# Patient Record
Sex: FEMALE | Race: WHITE | NOT HISPANIC OR LATINO | Employment: OTHER | URBAN - METROPOLITAN AREA
[De-identification: names, ages, dates, MRNs, and addresses within clinical notes are randomized per-mention and may not be internally consistent; named-entity substitution may affect disease eponyms.]

---

## 2018-01-30 ENCOUNTER — APPOINTMENT (OUTPATIENT)
Dept: GENERAL RADIOLOGY | Facility: HOSPITAL | Age: 53
End: 2018-01-30

## 2018-01-30 ENCOUNTER — HOSPITAL ENCOUNTER (INPATIENT)
Facility: HOSPITAL | Age: 53
LOS: 6 days | Discharge: HOME | End: 2018-02-05
Attending: HOSPITALIST | Admitting: FAMILY MEDICINE

## 2018-01-30 ENCOUNTER — APPOINTMENT (OUTPATIENT)
Dept: CARDIOLOGY | Facility: HOSPITAL | Age: 53
End: 2018-01-30
Attending: FAMILY MEDICINE

## 2018-01-30 DIAGNOSIS — Z74.09 IMPAIRED FUNCTIONAL MOBILITY, BALANCE, GAIT, AND ENDURANCE: Primary | ICD-10-CM

## 2018-01-30 PROBLEM — I38 VHD (VALVULAR HEART DISEASE): Status: ACTIVE | Noted: 2018-01-30

## 2018-01-30 PROBLEM — Z95.2 S/P AVR (AORTIC VALVE REPLACEMENT): Status: ACTIVE | Noted: 2018-01-30

## 2018-01-30 PROBLEM — E87.70 VOLUME OVERLOAD: Status: ACTIVE | Noted: 2018-01-30

## 2018-01-30 PROBLEM — R79.1 SUBTHERAPEUTIC INTERNATIONAL NORMALIZED RATIO (INR): Status: ACTIVE | Noted: 2018-01-30

## 2018-01-30 PROBLEM — I48.91 A-FIB (HCC): Status: ACTIVE | Noted: 2018-01-30

## 2018-01-30 PROBLEM — Z95.2 S/P MVR (MITRAL VALVE REPLACEMENT): Status: ACTIVE | Noted: 2018-01-30

## 2018-01-30 PROBLEM — Z86.73 H/O: CVA (CEREBROVASCULAR ACCIDENT): Status: ACTIVE | Noted: 2018-01-30

## 2018-01-30 PROBLEM — R91.8 PULMONARY INFILTRATE: Status: ACTIVE | Noted: 2018-01-30

## 2018-01-30 PROBLEM — Z87.891 FORMER SMOKER: Status: ACTIVE | Noted: 2018-01-30

## 2018-01-30 PROBLEM — I09.1 RHEUMATIC VALVULAR DISEASE: Status: ACTIVE | Noted: 2018-01-30

## 2018-01-30 PROBLEM — Z79.01 ANTICOAGULATED ON COUMADIN: Status: ACTIVE | Noted: 2018-01-30

## 2018-01-30 PROBLEM — J18.9 PNEUMONIA: Status: ACTIVE | Noted: 2018-01-30

## 2018-01-30 PROBLEM — I50.33 ACUTE ON CHRONIC DIASTOLIC CONGESTIVE HEART FAILURE (HCC): Status: ACTIVE | Noted: 2018-01-30

## 2018-01-30 LAB
ALBUMIN SERPL-MCNC: 4.8 G/DL (ref 3.2–4.8)
ALBUMIN/GLOB SERPL: 1.3 G/DL (ref 1.5–2.5)
ALP SERPL-CCNC: 75 U/L (ref 25–100)
ALT SERPL W P-5'-P-CCNC: 20 U/L (ref 7–40)
ANION GAP SERPL CALCULATED.3IONS-SCNC: 11 MMOL/L (ref 3–11)
ARTERIAL PATENCY WRIST A: ABNORMAL
AST SERPL-CCNC: 32 U/L (ref 0–33)
ATMOSPHERIC PRESS: ABNORMAL MMHG
BACTERIA UR QL AUTO: ABNORMAL /HPF
BASE EXCESS BLDA CALC-SCNC: -1.1 MMOL/L (ref 0–2)
BASOPHILS # BLD AUTO: 0.01 10*3/MM3 (ref 0–0.2)
BASOPHILS # BLD AUTO: 0.01 10*3/MM3 (ref 0–0.2)
BASOPHILS NFR BLD AUTO: 0 % (ref 0–1)
BASOPHILS NFR BLD AUTO: 0.1 % (ref 0–1)
BDY SITE: ABNORMAL
BILIRUB SERPL-MCNC: 1 MG/DL (ref 0.3–1.2)
BILIRUB UR QL STRIP: NEGATIVE
BNP SERPL-MCNC: 707 PG/ML (ref 0–100)
BUN BLD-MCNC: 17 MG/DL (ref 9–23)
BUN/CREAT SERPL: 21.3 (ref 7–25)
CA-I SERPL ISE-MCNC: 1.49 MMOL/L (ref 1.12–1.32)
CALCIUM SPEC-SCNC: 10.7 MG/DL (ref 8.7–10.4)
CHLORIDE SERPL-SCNC: 102 MMOL/L (ref 99–109)
CLARITY UR: ABNORMAL
CO2 BLDA-SCNC: 22.1 MMOL/L (ref 22–33)
CO2 SERPL-SCNC: 21 MMOL/L (ref 20–31)
COHGB MFR BLD: 1.3 % (ref 0–2)
COLOR UR: YELLOW
CREAT BLD-MCNC: 0.8 MG/DL (ref 0.6–1.3)
D-LACTATE SERPL-SCNC: 2.9 MMOL/L (ref 0.5–2)
DEPRECATED RDW RBC AUTO: 44.1 FL (ref 37–54)
DEPRECATED RDW RBC AUTO: 45.7 FL (ref 37–54)
EOSINOPHIL # BLD AUTO: 0 10*3/MM3 (ref 0–0.3)
EOSINOPHIL # BLD AUTO: 0 10*3/MM3 (ref 0–0.3)
EOSINOPHIL NFR BLD AUTO: 0 % (ref 0–3)
EOSINOPHIL NFR BLD AUTO: 0 % (ref 0–3)
ERYTHROCYTE [DISTWIDTH] IN BLOOD BY AUTOMATED COUNT: 14.4 % (ref 11.3–14.5)
ERYTHROCYTE [DISTWIDTH] IN BLOOD BY AUTOMATED COUNT: 14.7 % (ref 11.3–14.5)
GFR SERPL CREATININE-BSD FRML MDRD: 76 ML/MIN/1.73
GLOBULIN UR ELPH-MCNC: 3.8 GM/DL
GLUCOSE BLD-MCNC: 151 MG/DL (ref 70–100)
GLUCOSE UR STRIP-MCNC: NEGATIVE MG/DL
HBA1C MFR BLD: 5.5 % (ref 4.8–5.6)
HCO3 BLDA-SCNC: 21.2 MMOL/L (ref 20–26)
HCT VFR BLD AUTO: 42.1 % (ref 34.5–44)
HCT VFR BLD AUTO: 44.4 % (ref 34.5–44)
HCT VFR BLD CALC: 43.6 %
HGB BLD-MCNC: 13.7 G/DL (ref 11.5–15.5)
HGB BLD-MCNC: 15 G/DL (ref 11.5–15.5)
HGB BLDA-MCNC: 14.2 G/DL (ref 14–18)
HGB UR QL STRIP.AUTO: ABNORMAL
HOLD SPECIMEN: NORMAL
HOROWITZ INDEX BLD+IHG-RTO: 21 %
IMM GRANULOCYTES # BLD: 0.01 10*3/MM3 (ref 0–0.03)
IMM GRANULOCYTES # BLD: 0.11 10*3/MM3 (ref 0–0.03)
IMM GRANULOCYTES NFR BLD: 0.1 % (ref 0–0.6)
IMM GRANULOCYTES NFR BLD: 0.4 % (ref 0–0.6)
INR PPP: 1.28
KETONES UR QL STRIP: NEGATIVE
LEUKOCYTE ESTERASE UR QL STRIP.AUTO: NEGATIVE
LYMPHOCYTES # BLD AUTO: 0.74 10*3/MM3 (ref 0.6–4.8)
LYMPHOCYTES # BLD AUTO: 1 10*3/MM3 (ref 0.6–4.8)
LYMPHOCYTES NFR BLD AUTO: 3.8 % (ref 24–44)
LYMPHOCYTES NFR BLD AUTO: 7.8 % (ref 24–44)
MAGNESIUM SERPL-MCNC: 2.1 MG/DL (ref 1.3–2.7)
MAGNESIUM SERPL-MCNC: 2.1 MG/DL (ref 1.3–2.7)
MCH RBC QN AUTO: 27.8 PG (ref 27–31)
MCH RBC QN AUTO: 28.2 PG (ref 27–31)
MCHC RBC AUTO-ENTMCNC: 32.5 G/DL (ref 32–36)
MCHC RBC AUTO-ENTMCNC: 33.8 G/DL (ref 32–36)
MCV RBC AUTO: 83.5 FL (ref 80–99)
MCV RBC AUTO: 85.6 FL (ref 80–99)
METHGB BLD QL: 0 % (ref 0–1.5)
MODALITY: ABNORMAL
MONOCYTES # BLD AUTO: 0.1 10*3/MM3 (ref 0–1)
MONOCYTES # BLD AUTO: 0.95 10*3/MM3 (ref 0–1)
MONOCYTES NFR BLD AUTO: 1.1 % (ref 0–12)
MONOCYTES NFR BLD AUTO: 3.6 % (ref 0–12)
NEUTROPHILS # BLD AUTO: 24.47 10*3/MM3 (ref 1.5–8.3)
NEUTROPHILS # BLD AUTO: 8.64 10*3/MM3 (ref 1.5–8.3)
NEUTROPHILS NFR BLD AUTO: 90.9 % (ref 41–71)
NEUTROPHILS NFR BLD AUTO: 92.2 % (ref 41–71)
NITRITE UR QL STRIP: NEGATIVE
OXYHGB MFR BLDV: 92.7 % (ref 94–99)
PCO2 BLDA: 28.7 MM HG (ref 35–45)
PH BLDA: 7.48 PH UNITS (ref 7.35–7.45)
PH UR STRIP.AUTO: <=5 [PH] (ref 5–8)
PLATELET # BLD AUTO: 301 10*3/MM3 (ref 150–450)
PLATELET # BLD AUTO: 329 10*3/MM3 (ref 150–450)
PMV BLD AUTO: 11.5 FL (ref 6–12)
PMV BLD AUTO: 11.5 FL (ref 6–12)
PO2 BLDA: 63.4 MM HG (ref 83–108)
POTASSIUM BLD-SCNC: 3.8 MMOL/L (ref 3.5–5.5)
PROCALCITONIN SERPL-MCNC: <0.05 NG/ML
PROT SERPL-MCNC: 8.6 G/DL (ref 5.7–8.2)
PROT UR QL STRIP: NEGATIVE
PROTHROMBIN TIME: 14.1 SECONDS (ref 9.6–11.5)
RBC # BLD AUTO: 4.92 10*6/MM3 (ref 3.89–5.14)
RBC # BLD AUTO: 5.32 10*6/MM3 (ref 3.89–5.14)
RBC # UR: ABNORMAL /HPF
REF LAB TEST METHOD: ABNORMAL
SODIUM BLD-SCNC: 134 MMOL/L (ref 132–146)
SP GR UR STRIP: 1.02 (ref 1–1.03)
SQUAMOUS #/AREA URNS HPF: ABNORMAL /HPF
TROPONIN I SERPL-MCNC: 0.04 NG/ML
TROPONIN I SERPL-MCNC: 0.05 NG/ML
TSH SERPL DL<=0.05 MIU/L-ACNC: 1.25 MIU/ML (ref 0.35–5.35)
UROBILINOGEN UR QL STRIP: ABNORMAL
WBC NRBC COR # BLD: 26.54 10*3/MM3 (ref 3.5–10.8)
WBC NRBC COR # BLD: 9.5 10*3/MM3 (ref 3.5–10.8)
WBC UR QL AUTO: ABNORMAL /HPF

## 2018-01-30 PROCEDURE — 99223 1ST HOSP IP/OBS HIGH 75: CPT | Performed by: FAMILY MEDICINE

## 2018-01-30 PROCEDURE — 94640 AIRWAY INHALATION TREATMENT: CPT

## 2018-01-30 PROCEDURE — 82330 ASSAY OF CALCIUM: CPT | Performed by: FAMILY MEDICINE

## 2018-01-30 PROCEDURE — 85025 COMPLETE CBC W/AUTO DIFF WBC: CPT | Performed by: FAMILY MEDICINE

## 2018-01-30 PROCEDURE — 83036 HEMOGLOBIN GLYCOSYLATED A1C: CPT | Performed by: FAMILY MEDICINE

## 2018-01-30 PROCEDURE — 63710000001 PREDNISONE PER 1 MG: Performed by: FAMILY MEDICINE

## 2018-01-30 PROCEDURE — 83880 ASSAY OF NATRIURETIC PEPTIDE: CPT | Performed by: FAMILY MEDICINE

## 2018-01-30 PROCEDURE — 25010000002 ENOXAPARIN PER 10 MG

## 2018-01-30 PROCEDURE — 87086 URINE CULTURE/COLONY COUNT: CPT | Performed by: FAMILY MEDICINE

## 2018-01-30 PROCEDURE — 84484 ASSAY OF TROPONIN QUANT: CPT | Performed by: FAMILY MEDICINE

## 2018-01-30 PROCEDURE — 84443 ASSAY THYROID STIM HORMONE: CPT | Performed by: FAMILY MEDICINE

## 2018-01-30 PROCEDURE — 85610 PROTHROMBIN TIME: CPT | Performed by: FAMILY MEDICINE

## 2018-01-30 PROCEDURE — 83605 ASSAY OF LACTIC ACID: CPT | Performed by: NURSE PRACTITIONER

## 2018-01-30 PROCEDURE — 25010000002 DIGOXIN PER 500 MCG: Performed by: NURSE PRACTITIONER

## 2018-01-30 PROCEDURE — 93005 ELECTROCARDIOGRAM TRACING: CPT | Performed by: NURSE PRACTITIONER

## 2018-01-30 PROCEDURE — 83735 ASSAY OF MAGNESIUM: CPT | Performed by: FAMILY MEDICINE

## 2018-01-30 PROCEDURE — 94799 UNLISTED PULMONARY SVC/PX: CPT

## 2018-01-30 PROCEDURE — 94760 N-INVAS EAR/PLS OXIMETRY 1: CPT

## 2018-01-30 PROCEDURE — 81001 URINALYSIS AUTO W/SCOPE: CPT | Performed by: FAMILY MEDICINE

## 2018-01-30 PROCEDURE — 25010000002 AMIODARONE IN DEXTROSE 5% 150-4.21 MG/100ML-% SOLUTION: Performed by: INTERNAL MEDICINE

## 2018-01-30 PROCEDURE — 93306 TTE W/DOPPLER COMPLETE: CPT

## 2018-01-30 PROCEDURE — 82805 BLOOD GASES W/O2 SATURATION: CPT | Performed by: NURSE PRACTITIONER

## 2018-01-30 PROCEDURE — 84484 ASSAY OF TROPONIN QUANT: CPT | Performed by: NURSE PRACTITIONER

## 2018-01-30 PROCEDURE — 25010000002 AMIODARONE IN DEXTROSE 5% 360-4.14 MG/200ML-% SOLUTION: Performed by: INTERNAL MEDICINE

## 2018-01-30 PROCEDURE — 87040 BLOOD CULTURE FOR BACTERIA: CPT | Performed by: FAMILY MEDICINE

## 2018-01-30 PROCEDURE — 36600 WITHDRAWAL OF ARTERIAL BLOOD: CPT | Performed by: NURSE PRACTITIONER

## 2018-01-30 PROCEDURE — 71045 X-RAY EXAM CHEST 1 VIEW: CPT

## 2018-01-30 PROCEDURE — 93005 ELECTROCARDIOGRAM TRACING: CPT | Performed by: FAMILY MEDICINE

## 2018-01-30 PROCEDURE — 25010000002 CEFTRIAXONE PER 250 MG: Performed by: FAMILY MEDICINE

## 2018-01-30 PROCEDURE — 84145 PROCALCITONIN (PCT): CPT | Performed by: FAMILY MEDICINE

## 2018-01-30 PROCEDURE — 25010000002 FUROSEMIDE PER 20 MG: Performed by: FAMILY MEDICINE

## 2018-01-30 PROCEDURE — 93010 ELECTROCARDIOGRAM REPORT: CPT | Performed by: INTERNAL MEDICINE

## 2018-01-30 PROCEDURE — 85025 COMPLETE CBC W/AUTO DIFF WBC: CPT | Performed by: NURSE PRACTITIONER

## 2018-01-30 PROCEDURE — 83735 ASSAY OF MAGNESIUM: CPT | Performed by: NURSE PRACTITIONER

## 2018-01-30 PROCEDURE — 99291 CRITICAL CARE FIRST HOUR: CPT | Performed by: INTERNAL MEDICINE

## 2018-01-30 PROCEDURE — 80053 COMPREHEN METABOLIC PANEL: CPT | Performed by: FAMILY MEDICINE

## 2018-01-30 RX ORDER — DIGOXIN 0.25 MG/ML
500 INJECTION INTRAMUSCULAR; INTRAVENOUS ONCE
Status: COMPLETED | OUTPATIENT
Start: 2018-01-30 | End: 2018-01-30

## 2018-01-30 RX ORDER — ACETAMINOPHEN 325 MG/1
650 TABLET ORAL EVERY 4 HOURS PRN
Status: DISCONTINUED | OUTPATIENT
Start: 2018-01-30 | End: 2018-02-05 | Stop reason: HOSPADM

## 2018-01-30 RX ORDER — CARVEDILOL 3.12 MG/1
3.12 TABLET ORAL 2 TIMES DAILY
Status: DISCONTINUED | OUTPATIENT
Start: 2018-01-30 | End: 2018-01-30

## 2018-01-30 RX ORDER — FUROSEMIDE 40 MG/1
40 TABLET ORAL DAILY
Status: DISCONTINUED | OUTPATIENT
Start: 2018-01-30 | End: 2018-01-31

## 2018-01-30 RX ORDER — SODIUM CHLORIDE 0.9 % (FLUSH) 0.9 %
1-10 SYRINGE (ML) INJECTION AS NEEDED
Status: DISCONTINUED | OUTPATIENT
Start: 2018-01-30 | End: 2018-02-05 | Stop reason: HOSPADM

## 2018-01-30 RX ORDER — FUROSEMIDE 10 MG/ML
40 INJECTION INTRAMUSCULAR; INTRAVENOUS EVERY 12 HOURS
Status: DISCONTINUED | OUTPATIENT
Start: 2018-01-30 | End: 2018-01-30

## 2018-01-30 RX ORDER — WARFARIN SODIUM 5 MG/1
5 TABLET ORAL
Status: DISCONTINUED | OUTPATIENT
Start: 2018-01-30 | End: 2018-01-30

## 2018-01-30 RX ORDER — NYSTATIN 100000 [USP'U]/G
POWDER TOPICAL EVERY 12 HOURS SCHEDULED
Status: DISCONTINUED | OUTPATIENT
Start: 2018-01-30 | End: 2018-02-05 | Stop reason: HOSPADM

## 2018-01-30 RX ORDER — ALBUMIN, HUMAN INJ 5% 5 %
500 SOLUTION INTRAVENOUS ONCE
Status: COMPLETED | OUTPATIENT
Start: 2018-01-31 | End: 2018-01-30

## 2018-01-30 RX ORDER — IPRATROPIUM BROMIDE AND ALBUTEROL SULFATE 2.5; .5 MG/3ML; MG/3ML
3 SOLUTION RESPIRATORY (INHALATION)
Status: DISCONTINUED | OUTPATIENT
Start: 2018-01-30 | End: 2018-02-03

## 2018-01-30 RX ORDER — ONDANSETRON 2 MG/ML
4 INJECTION INTRAMUSCULAR; INTRAVENOUS EVERY 6 HOURS PRN
Status: DISCONTINUED | OUTPATIENT
Start: 2018-01-30 | End: 2018-02-05 | Stop reason: HOSPADM

## 2018-01-30 RX ORDER — WARFARIN SODIUM 3 MG/1
6 TABLET ORAL
Status: DISCONTINUED | OUTPATIENT
Start: 2018-01-30 | End: 2018-02-01

## 2018-01-30 RX ORDER — AMIODARONE HYDROCHLORIDE 200 MG/1
200 TABLET ORAL DAILY
Status: DISCONTINUED | OUTPATIENT
Start: 2018-02-22 | End: 2018-02-05 | Stop reason: HOSPADM

## 2018-01-30 RX ORDER — BISACODYL 5 MG/1
5 TABLET, DELAYED RELEASE ORAL DAILY PRN
Status: DISCONTINUED | OUTPATIENT
Start: 2018-01-30 | End: 2018-02-05 | Stop reason: HOSPADM

## 2018-01-30 RX ORDER — PREDNISONE 20 MG/1
40 TABLET ORAL
Status: DISCONTINUED | OUTPATIENT
Start: 2018-01-30 | End: 2018-01-31

## 2018-01-30 RX ORDER — POTASSIUM CHLORIDE 1.5 G/1.77G
20 POWDER, FOR SOLUTION ORAL 2 TIMES DAILY
Status: DISCONTINUED | OUTPATIENT
Start: 2018-01-30 | End: 2018-01-30

## 2018-01-30 RX ORDER — MAGNESIUM SULFATE HEPTAHYDRATE 40 MG/ML
2 INJECTION, SOLUTION INTRAVENOUS AS NEEDED
Status: DISCONTINUED | OUTPATIENT
Start: 2018-01-30 | End: 2018-02-05 | Stop reason: HOSPADM

## 2018-01-30 RX ORDER — POTASSIUM CHLORIDE 750 MG/1
20 CAPSULE, EXTENDED RELEASE ORAL 2 TIMES DAILY
Status: DISCONTINUED | OUTPATIENT
Start: 2018-01-30 | End: 2018-02-05 | Stop reason: HOSPADM

## 2018-01-30 RX ORDER — AMIODARONE HYDROCHLORIDE 200 MG/1
200 TABLET ORAL EVERY 12 HOURS
Status: DISCONTINUED | OUTPATIENT
Start: 2018-02-07 | End: 2018-02-05 | Stop reason: HOSPADM

## 2018-01-30 RX ORDER — AMIODARONE HYDROCHLORIDE 200 MG/1
200 TABLET ORAL ONCE
Status: COMPLETED | OUTPATIENT
Start: 2018-01-31 | End: 2018-01-31

## 2018-01-30 RX ORDER — POTASSIUM CHLORIDE 7.45 MG/ML
10 INJECTION INTRAVENOUS
Status: DISCONTINUED | OUTPATIENT
Start: 2018-01-30 | End: 2018-02-05 | Stop reason: HOSPADM

## 2018-01-30 RX ORDER — FUROSEMIDE 40 MG/1
40 TABLET ORAL DAILY
COMMUNITY
End: 2018-02-05 | Stop reason: HOSPADM

## 2018-01-30 RX ORDER — ATORVASTATIN CALCIUM 10 MG/1
10 TABLET, FILM COATED ORAL DAILY
Status: DISCONTINUED | OUTPATIENT
Start: 2018-01-30 | End: 2018-02-02

## 2018-01-30 RX ORDER — SENNA AND DOCUSATE SODIUM 50; 8.6 MG/1; MG/1
2 TABLET, FILM COATED ORAL NIGHTLY
Status: DISCONTINUED | OUTPATIENT
Start: 2018-01-30 | End: 2018-02-05 | Stop reason: HOSPADM

## 2018-01-30 RX ORDER — MAGNESIUM SULFATE HEPTAHYDRATE 40 MG/ML
4 INJECTION, SOLUTION INTRAVENOUS AS NEEDED
Status: DISCONTINUED | OUTPATIENT
Start: 2018-01-30 | End: 2018-02-05 | Stop reason: HOSPADM

## 2018-01-30 RX ORDER — POTASSIUM CHLORIDE 20 MEQ/1
20 TABLET, EXTENDED RELEASE ORAL 2 TIMES DAILY
COMMUNITY
End: 2018-02-05 | Stop reason: HOSPADM

## 2018-01-30 RX ORDER — CARVEDILOL 3.12 MG/1
3.12 TABLET ORAL 2 TIMES DAILY
COMMUNITY
End: 2018-02-05 | Stop reason: HOSPADM

## 2018-01-30 RX ORDER — WARFARIN SODIUM 6 MG/1
6 TABLET ORAL
Status: ON HOLD | COMMUNITY
End: 2018-01-31

## 2018-01-30 RX ORDER — POTASSIUM CHLORIDE 750 MG/1
40 CAPSULE, EXTENDED RELEASE ORAL AS NEEDED
Status: DISCONTINUED | OUTPATIENT
Start: 2018-01-30 | End: 2018-02-05 | Stop reason: HOSPADM

## 2018-01-30 RX ORDER — CEFTRIAXONE SODIUM 1 G/50ML
1 INJECTION, SOLUTION INTRAVENOUS EVERY 24 HOURS
Status: DISCONTINUED | OUTPATIENT
Start: 2018-01-30 | End: 2018-02-01

## 2018-01-30 RX ORDER — DILTIAZEM HCL IN NACL,ISO-OSM 125 MG/125
5-15 PLASTIC BAG, INJECTION (ML) INTRAVENOUS
Status: DISCONTINUED | OUTPATIENT
Start: 2018-01-30 | End: 2018-02-01

## 2018-01-30 RX ORDER — ATORVASTATIN CALCIUM 10 MG/1
10 TABLET, FILM COATED ORAL DAILY
COMMUNITY

## 2018-01-30 RX ORDER — POTASSIUM CHLORIDE 1.5 G/1.77G
40 POWDER, FOR SOLUTION ORAL AS NEEDED
Status: DISCONTINUED | OUTPATIENT
Start: 2018-01-30 | End: 2018-02-05 | Stop reason: HOSPADM

## 2018-01-30 RX ORDER — RANITIDINE 300 MG/1
300 TABLET ORAL 2 TIMES DAILY
COMMUNITY
End: 2019-05-02

## 2018-01-30 RX ORDER — AMIODARONE HYDROCHLORIDE 200 MG/1
200 TABLET ORAL EVERY 8 HOURS
Status: DISCONTINUED | OUTPATIENT
Start: 2018-02-01 | End: 2018-02-05 | Stop reason: HOSPADM

## 2018-01-30 RX ORDER — DOXYCYCLINE HYCLATE 100 MG/1
100 CAPSULE ORAL EVERY 12 HOURS SCHEDULED
Status: DISCONTINUED | OUTPATIENT
Start: 2018-01-30 | End: 2018-02-01

## 2018-01-30 RX ORDER — FAMOTIDINE 20 MG/1
40 TABLET, FILM COATED ORAL DAILY
Status: DISCONTINUED | OUTPATIENT
Start: 2018-01-30 | End: 2018-01-31

## 2018-01-30 RX ADMIN — POTASSIUM CHLORIDE 20 MEQ: 750 CAPSULE, EXTENDED RELEASE ORAL at 15:03

## 2018-01-30 RX ADMIN — ENOXAPARIN SODIUM 90 MG: 100 INJECTION SUBCUTANEOUS at 10:03

## 2018-01-30 RX ADMIN — DOXYCYCLINE HYCLATE 100 MG: 100 CAPSULE ORAL at 10:04

## 2018-01-30 RX ADMIN — IPRATROPIUM BROMIDE AND ALBUTEROL SULFATE 3 ML: .5; 3 SOLUTION RESPIRATORY (INHALATION) at 09:26

## 2018-01-30 RX ADMIN — CEFTRIAXONE SODIUM 1 G: 1 INJECTION, SOLUTION INTRAVENOUS at 10:02

## 2018-01-30 RX ADMIN — ALBUMIN HUMAN 500 ML: 0.05 INJECTION, SOLUTION INTRAVENOUS at 23:36

## 2018-01-30 RX ADMIN — SACUBITRIL AND VALSARTAN 1 TABLET: 49; 51 TABLET, FILM COATED ORAL at 15:03

## 2018-01-30 RX ADMIN — SODIUM CHLORIDE 250 ML: 9 INJECTION, SOLUTION INTRAVENOUS at 18:21

## 2018-01-30 RX ADMIN — ATORVASTATIN CALCIUM 10 MG: 10 TABLET, FILM COATED ORAL at 13:33

## 2018-01-30 RX ADMIN — SODIUM CHLORIDE 250 ML: 9 INJECTION, SOLUTION INTRAVENOUS at 17:00

## 2018-01-30 RX ADMIN — PREDNISONE 40 MG: 20 TABLET ORAL at 15:03

## 2018-01-30 RX ADMIN — SODIUM CHLORIDE 500 ML: 9 INJECTION, SOLUTION INTRAVENOUS at 11:10

## 2018-01-30 RX ADMIN — DOXYCYCLINE HYCLATE 100 MG: 100 CAPSULE ORAL at 21:34

## 2018-01-30 RX ADMIN — FUROSEMIDE 40 MG: 40 TABLET ORAL at 13:33

## 2018-01-30 RX ADMIN — IPRATROPIUM BROMIDE AND ALBUTEROL SULFATE 3 ML: .5; 3 SOLUTION RESPIRATORY (INHALATION) at 19:08

## 2018-01-30 RX ADMIN — FUROSEMIDE 40 MG: 10 INJECTION, SOLUTION INTRAMUSCULAR; INTRAVENOUS at 10:02

## 2018-01-30 RX ADMIN — DILTIAZEM HYDROCHLORIDE 5 MG/HR: 5 INJECTION INTRAVENOUS at 10:04

## 2018-01-30 RX ADMIN — AMIODARONE HYDROCHLORIDE 1 MG/MIN: 50 INJECTION, SOLUTION INTRAVENOUS at 22:40

## 2018-01-30 RX ADMIN — POTASSIUM CHLORIDE 20 MEQ: 750 CAPSULE, EXTENDED RELEASE ORAL at 21:33

## 2018-01-30 RX ADMIN — AMIODARONE HYDROCHLORIDE 150 MG: 1.5 INJECTION, SOLUTION INTRAVENOUS at 22:28

## 2018-01-30 RX ADMIN — IPRATROPIUM BROMIDE AND ALBUTEROL SULFATE 3 ML: .5; 3 SOLUTION RESPIRATORY (INHALATION) at 15:39

## 2018-01-30 RX ADMIN — ENOXAPARIN SODIUM 90 MG: 100 INJECTION SUBCUTANEOUS at 21:33

## 2018-01-30 RX ADMIN — Medication 2 TABLET: at 21:34

## 2018-01-30 RX ADMIN — FAMOTIDINE 40 MG: 20 TABLET, FILM COATED ORAL at 13:37

## 2018-01-30 RX ADMIN — DIGOXIN 500 MCG: 0.25 INJECTION INTRAMUSCULAR; INTRAVENOUS at 20:46

## 2018-01-30 RX ADMIN — WARFARIN SODIUM 6 MG: 3 TABLET ORAL at 17:50

## 2018-01-31 LAB
ANION GAP SERPL CALCULATED.3IONS-SCNC: 7 MMOL/L (ref 3–11)
BASOPHILS # BLD AUTO: 0.01 10*3/MM3 (ref 0–0.2)
BASOPHILS NFR BLD AUTO: 0 % (ref 0–1)
BH CV ECHO MEAS - AO MAX PG (FULL): 84.9 MMHG
BH CV ECHO MEAS - AO MAX PG: 88 MMHG
BH CV ECHO MEAS - AO MEAN PG (FULL): 50.2 MMHG
BH CV ECHO MEAS - AO MEAN PG: 52.1 MMHG
BH CV ECHO MEAS - AO ROOT AREA: 7.8 CM^2
BH CV ECHO MEAS - AO ROOT DIAM: 3.1 CM
BH CV ECHO MEAS - AO V2 MAX: 467.9 CM/SEC
BH CV ECHO MEAS - AO V2 MEAN: 332.1 CM/SEC
BH CV ECHO MEAS - AO V2 VTI: 89.1 CM
BH CV ECHO MEAS - AVA(I,A): 0.56 CM^2
BH CV ECHO MEAS - AVA(I,D): 0.56 CM^2
BH CV ECHO MEAS - AVA(V,A): 0.52 CM^2
BH CV ECHO MEAS - AVA(V,D): 0.52 CM^2
BH CV ECHO MEAS - CO(CUBED): 4.4 L/MIN
BH CV ECHO MEAS - CO(TEICH): 3.8 L/MIN
BH CV ECHO MEAS - EDV(CUBED): 98.1 ML
BH CV ECHO MEAS - EDV(TEICH): 97.9 ML
BH CV ECHO MEAS - EF(CUBED): 61.8 %
BH CV ECHO MEAS - EF(TEICH): 53.4 %
BH CV ECHO MEAS - ESV(CUBED): 37.5 ML
BH CV ECHO MEAS - ESV(TEICH): 45.7 ML
BH CV ECHO MEAS - FS: 27.4 %
BH CV ECHO MEAS - IVS/LVPW: 0.9
BH CV ECHO MEAS - IVSD: 1 CM
BH CV ECHO MEAS - LA DIMENSION: 3.5 CM
BH CV ECHO MEAS - LA/AO: 1.1
BH CV ECHO MEAS - LAT PEAK E' VEL: 10 CM/SEC
BH CV ECHO MEAS - LV MASS(C)D: 171.1 GRAMS
BH CV ECHO MEAS - LV MAX PG: 3.1 MMHG
BH CV ECHO MEAS - LV MEAN PG: 1.9 MMHG
BH CV ECHO MEAS - LV V1 MAX: 87.5 CM/SEC
BH CV ECHO MEAS - LV V1 MEAN: 63.3 CM/SEC
BH CV ECHO MEAS - LV V1 VTI: 17.7 CM
BH CV ECHO MEAS - LVIDD: 4.6 CM
BH CV ECHO MEAS - LVIDS: 3.3 CM
BH CV ECHO MEAS - LVOT AREA (M): 2.8 CM^2
BH CV ECHO MEAS - LVOT AREA: 2.8 CM^2
BH CV ECHO MEAS - LVOT DIAM: 1.9 CM
BH CV ECHO MEAS - LVPWD: 1.1 CM
BH CV ECHO MEAS - MED PEAK E' VEL: 2.77 CM/SEC
BH CV ECHO MEAS - MM HR: 73 BPM
BH CV ECHO MEAS - MM R-R INT: 0.82 SEC
BH CV ECHO MEAS - MV DEC SLOPE: 739.6 CM/SEC^2
BH CV ECHO MEAS - MV DEC TIME: 0.25 SEC
BH CV ECHO MEAS - MV E MAX VEL: 144.2 CM/SEC
BH CV ECHO MEAS - MV MAX PG: 12.7 MMHG
BH CV ECHO MEAS - MV MEAN PG: 4.7 MMHG
BH CV ECHO MEAS - MV P1/2T MAX VEL: 177.8 CM/SEC
BH CV ECHO MEAS - MV P1/2T: 70.4 MSEC
BH CV ECHO MEAS - MV V2 MAX: 178.5 CM/SEC
BH CV ECHO MEAS - MV V2 MEAN: 98.1 CM/SEC
BH CV ECHO MEAS - MV V2 VTI: 34.2 CM
BH CV ECHO MEAS - MVA P1/2T LCG: 1.2 CM^2
BH CV ECHO MEAS - MVA(P1/2T): 3.1 CM^2
BH CV ECHO MEAS - MVA(VTI): 1.5 CM^2
BH CV ECHO MEAS - PA ACC SLOPE: 1560 CM/SEC^2
BH CV ECHO MEAS - PA ACC TIME: 0.07 SEC
BH CV ECHO MEAS - PA PR(ACCEL): 48.1 MMHG
BH CV ECHO MEAS - PI END-D VEL: 130.3 CM/SEC
BH CV ECHO MEAS - RVDD: 2.7 CM
BH CV ECHO MEAS - SV(AO): 693.8 ML
BH CV ECHO MEAS - SV(CUBED): 60.6 ML
BH CV ECHO MEAS - SV(LVOT): 49.6 ML
BH CV ECHO MEAS - SV(TEICH): 52.3 ML
BH CV ECHO MEAS - TAPSE (>1.6): 1.1 CM2
BH CV ECHO MEAS - TR MAX VEL: 239.2 CM/SEC
BH CV VAS BP LEFT ARM: NORMAL MMHG
BUN BLD-MCNC: 17 MG/DL (ref 9–23)
BUN/CREAT SERPL: 28.3 (ref 7–25)
CALCIUM SPEC-SCNC: 10.2 MG/DL (ref 8.7–10.4)
CHLORIDE SERPL-SCNC: 106 MMOL/L (ref 99–109)
CO2 SERPL-SCNC: 22 MMOL/L (ref 20–31)
CREAT BLD-MCNC: 0.6 MG/DL (ref 0.6–1.3)
D-LACTATE SERPL-SCNC: 1.2 MMOL/L (ref 0.5–2)
D-LACTATE SERPL-SCNC: 2.3 MMOL/L (ref 0.5–2)
DEPRECATED RDW RBC AUTO: 45.2 FL (ref 37–54)
E/E' RATIO: 33.2
EOSINOPHIL # BLD AUTO: 0 10*3/MM3 (ref 0–0.3)
EOSINOPHIL NFR BLD AUTO: 0 % (ref 0–3)
ERYTHROCYTE [DISTWIDTH] IN BLOOD BY AUTOMATED COUNT: 14.7 % (ref 11.3–14.5)
GFR SERPL CREATININE-BSD FRML MDRD: 106 ML/MIN/1.73
GLUCOSE BLD-MCNC: 124 MG/DL (ref 70–100)
HCT VFR BLD AUTO: 40.1 % (ref 34.5–44)
HGB BLD-MCNC: 13.1 G/DL (ref 11.5–15.5)
IMM GRANULOCYTES # BLD: 0.11 10*3/MM3 (ref 0–0.03)
IMM GRANULOCYTES NFR BLD: 0.4 % (ref 0–0.6)
INR PPP: 1.42
LYMPHOCYTES # BLD AUTO: 1.23 10*3/MM3 (ref 0.6–4.8)
LYMPHOCYTES NFR BLD AUTO: 4.3 % (ref 24–44)
MAGNESIUM SERPL-MCNC: 2.2 MG/DL (ref 1.3–2.7)
MCH RBC QN AUTO: 27.5 PG (ref 27–31)
MCHC RBC AUTO-ENTMCNC: 32.7 G/DL (ref 32–36)
MCV RBC AUTO: 84.2 FL (ref 80–99)
MONOCYTES # BLD AUTO: 1.43 10*3/MM3 (ref 0–1)
MONOCYTES NFR BLD AUTO: 5 % (ref 0–12)
NEUTROPHILS # BLD AUTO: 25.54 10*3/MM3 (ref 1.5–8.3)
NEUTROPHILS NFR BLD AUTO: 90.3 % (ref 41–71)
PHOSPHATE SERPL-MCNC: 1.8 MG/DL (ref 2.4–5.1)
PLATELET # BLD AUTO: 290 10*3/MM3 (ref 150–450)
PMV BLD AUTO: 11.5 FL (ref 6–12)
POTASSIUM BLD-SCNC: 4.1 MMOL/L (ref 3.5–5.5)
PROTHROMBIN TIME: 15.6 SECONDS (ref 9.6–11.5)
RBC # BLD AUTO: 4.76 10*6/MM3 (ref 3.89–5.14)
SODIUM BLD-SCNC: 135 MMOL/L (ref 132–146)
TROPONIN I SERPL-MCNC: 1.48 NG/ML
WBC NRBC COR # BLD: 28.32 10*3/MM3 (ref 3.5–10.8)

## 2018-01-31 PROCEDURE — 83605 ASSAY OF LACTIC ACID: CPT | Performed by: NURSE PRACTITIONER

## 2018-01-31 PROCEDURE — 63710000001 PREDNISONE PER 1 MG: Performed by: FAMILY MEDICINE

## 2018-01-31 PROCEDURE — P9041 ALBUMIN (HUMAN),5%, 50ML: HCPCS | Performed by: NURSE PRACTITIONER

## 2018-01-31 PROCEDURE — 25010000002 AMIODARONE IN DEXTROSE 5% 360-4.14 MG/200ML-% SOLUTION: Performed by: INTERNAL MEDICINE

## 2018-01-31 PROCEDURE — 97162 PT EVAL MOD COMPLEX 30 MIN: CPT

## 2018-01-31 PROCEDURE — 94760 N-INVAS EAR/PLS OXIMETRY 1: CPT

## 2018-01-31 PROCEDURE — 85610 PROTHROMBIN TIME: CPT

## 2018-01-31 PROCEDURE — 94799 UNLISTED PULMONARY SVC/PX: CPT

## 2018-01-31 PROCEDURE — 99233 SBSQ HOSP IP/OBS HIGH 50: CPT | Performed by: INTERNAL MEDICINE

## 2018-01-31 PROCEDURE — 25010000002 CEFTRIAXONE PER 250 MG: Performed by: FAMILY MEDICINE

## 2018-01-31 PROCEDURE — 25010000002 ENOXAPARIN PER 10 MG

## 2018-01-31 PROCEDURE — 80048 BASIC METABOLIC PNL TOTAL CA: CPT | Performed by: NURSE PRACTITIONER

## 2018-01-31 PROCEDURE — 25010000002 ALBUMIN HUMAN 5% PER 50 ML: Performed by: NURSE PRACTITIONER

## 2018-01-31 PROCEDURE — 94640 AIRWAY INHALATION TREATMENT: CPT

## 2018-01-31 PROCEDURE — 84100 ASSAY OF PHOSPHORUS: CPT | Performed by: NURSE PRACTITIONER

## 2018-01-31 PROCEDURE — 83735 ASSAY OF MAGNESIUM: CPT | Performed by: NURSE PRACTITIONER

## 2018-01-31 PROCEDURE — 84484 ASSAY OF TROPONIN QUANT: CPT | Performed by: NURSE PRACTITIONER

## 2018-01-31 PROCEDURE — 85025 COMPLETE CBC W/AUTO DIFF WBC: CPT | Performed by: NURSE PRACTITIONER

## 2018-01-31 RX ORDER — OXYMETAZOLINE HYDROCHLORIDE 0.05 G/100ML
2 SPRAY NASAL 2 TIMES DAILY
Status: COMPLETED | OUTPATIENT
Start: 2018-01-31 | End: 2018-02-02

## 2018-01-31 RX ORDER — FAMOTIDINE 20 MG/1
20 TABLET, FILM COATED ORAL DAILY
Status: DISCONTINUED | OUTPATIENT
Start: 2018-01-31 | End: 2018-02-05 | Stop reason: HOSPADM

## 2018-01-31 RX ORDER — FLUTICASONE PROPIONATE 50 MCG
2 SPRAY, SUSPENSION (ML) NASAL DAILY
Status: DISCONTINUED | OUTPATIENT
Start: 2018-01-31 | End: 2018-02-05 | Stop reason: HOSPADM

## 2018-01-31 RX ORDER — WARFARIN SODIUM 5 MG/1
5 TABLET ORAL
COMMUNITY

## 2018-01-31 RX ORDER — WARFARIN SODIUM 1 MG/1
2 TABLET ORAL
COMMUNITY

## 2018-01-31 RX ADMIN — IPRATROPIUM BROMIDE AND ALBUTEROL SULFATE 3 ML: .5; 3 SOLUTION RESPIRATORY (INHALATION) at 10:52

## 2018-01-31 RX ADMIN — POTASSIUM CHLORIDE 20 MEQ: 750 CAPSULE, EXTENDED RELEASE ORAL at 21:01

## 2018-01-31 RX ADMIN — FLUTICASONE PROPIONATE 2 SPRAY: 50 SPRAY, METERED NASAL at 11:41

## 2018-01-31 RX ADMIN — IPRATROPIUM BROMIDE AND ALBUTEROL SULFATE 3 ML: .5; 3 SOLUTION RESPIRATORY (INHALATION) at 03:12

## 2018-01-31 RX ADMIN — NYSTATIN 1 APPLICATION: 100000 POWDER TOPICAL at 21:14

## 2018-01-31 RX ADMIN — AMIODARONE HYDROCHLORIDE 0.5 MG/MIN: 50 INJECTION, SOLUTION INTRAVENOUS at 16:09

## 2018-01-31 RX ADMIN — IPRATROPIUM BROMIDE AND ALBUTEROL SULFATE 3 ML: .5; 3 SOLUTION RESPIRATORY (INHALATION) at 07:18

## 2018-01-31 RX ADMIN — PREDNISONE 40 MG: 20 TABLET ORAL at 08:17

## 2018-01-31 RX ADMIN — ENOXAPARIN SODIUM 90 MG: 100 INJECTION SUBCUTANEOUS at 21:01

## 2018-01-31 RX ADMIN — IPRATROPIUM BROMIDE AND ALBUTEROL SULFATE 3 ML: .5; 3 SOLUTION RESPIRATORY (INHALATION) at 18:51

## 2018-01-31 RX ADMIN — Medication 2 TABLET: at 21:01

## 2018-01-31 RX ADMIN — FAMOTIDINE 40 MG: 20 TABLET, FILM COATED ORAL at 08:17

## 2018-01-31 RX ADMIN — ATORVASTATIN CALCIUM 10 MG: 10 TABLET, FILM COATED ORAL at 08:17

## 2018-01-31 RX ADMIN — ENOXAPARIN SODIUM 90 MG: 100 INJECTION SUBCUTANEOUS at 08:18

## 2018-01-31 RX ADMIN — OXYMETAZOLINE HYDROCHLORIDE 2 SPRAY: 5 SPRAY NASAL at 21:02

## 2018-01-31 RX ADMIN — IPRATROPIUM BROMIDE AND ALBUTEROL SULFATE 3 ML: .5; 3 SOLUTION RESPIRATORY (INHALATION) at 23:00

## 2018-01-31 RX ADMIN — IPRATROPIUM BROMIDE AND ALBUTEROL SULFATE 3 ML: .5; 3 SOLUTION RESPIRATORY (INHALATION) at 16:00

## 2018-01-31 RX ADMIN — AMIODARONE HYDROCHLORIDE 0.5 MG/MIN: 50 INJECTION, SOLUTION INTRAVENOUS at 04:56

## 2018-01-31 RX ADMIN — DOXYCYCLINE HYCLATE 100 MG: 100 CAPSULE ORAL at 21:01

## 2018-01-31 RX ADMIN — OXYMETAZOLINE HYDROCHLORIDE 2 SPRAY: 5 SPRAY NASAL at 11:41

## 2018-01-31 RX ADMIN — AMIODARONE HYDROCHLORIDE 200 MG: 200 TABLET ORAL at 22:48

## 2018-01-31 RX ADMIN — CEFTRIAXONE SODIUM 1 G: 1 INJECTION, SOLUTION INTRAVENOUS at 08:17

## 2018-01-31 RX ADMIN — IPRATROPIUM BROMIDE AND ALBUTEROL SULFATE 3 ML: .5; 3 SOLUTION RESPIRATORY (INHALATION) at 00:00

## 2018-01-31 RX ADMIN — NYSTATIN: 100000 POWDER TOPICAL at 08:17

## 2018-01-31 RX ADMIN — WARFARIN SODIUM 6 MG: 3 TABLET ORAL at 17:24

## 2018-01-31 RX ADMIN — DOXYCYCLINE HYCLATE 100 MG: 100 CAPSULE ORAL at 08:17

## 2018-02-01 LAB
ANION GAP SERPL CALCULATED.3IONS-SCNC: 5 MMOL/L (ref 3–11)
BACTERIA SPEC AEROBE CULT: NORMAL
BUN BLD-MCNC: 18 MG/DL (ref 9–23)
BUN/CREAT SERPL: 30 (ref 7–25)
CALCIUM SPEC-SCNC: 10.4 MG/DL (ref 8.7–10.4)
CHLORIDE SERPL-SCNC: 106 MMOL/L (ref 99–109)
CO2 SERPL-SCNC: 24 MMOL/L (ref 20–31)
CREAT BLD-MCNC: 0.6 MG/DL (ref 0.6–1.3)
DEPRECATED RDW RBC AUTO: 46.4 FL (ref 37–54)
ERYTHROCYTE [DISTWIDTH] IN BLOOD BY AUTOMATED COUNT: 14.9 % (ref 11.3–14.5)
GFR SERPL CREATININE-BSD FRML MDRD: 106 ML/MIN/1.73
GLUCOSE BLD-MCNC: 98 MG/DL (ref 70–100)
HCT VFR BLD AUTO: 41.7 % (ref 34.5–44)
HGB BLD-MCNC: 13.5 G/DL (ref 11.5–15.5)
INR PPP: 1.75
MAGNESIUM SERPL-MCNC: 2.2 MG/DL (ref 1.3–2.7)
MCH RBC QN AUTO: 27.7 PG (ref 27–31)
MCHC RBC AUTO-ENTMCNC: 32.4 G/DL (ref 32–36)
MCV RBC AUTO: 85.6 FL (ref 80–99)
PHOSPHATE SERPL-MCNC: 1.6 MG/DL (ref 2.4–5.1)
PLATELET # BLD AUTO: 267 10*3/MM3 (ref 150–450)
PMV BLD AUTO: 11.7 FL (ref 6–12)
POTASSIUM BLD-SCNC: 4 MMOL/L (ref 3.5–5.5)
PROTHROMBIN TIME: 19.4 SECONDS (ref 9.6–11.5)
RBC # BLD AUTO: 4.87 10*6/MM3 (ref 3.89–5.14)
SODIUM BLD-SCNC: 135 MMOL/L (ref 132–146)
WBC NRBC COR # BLD: 19.14 10*3/MM3 (ref 3.5–10.8)

## 2018-02-01 PROCEDURE — 85027 COMPLETE CBC AUTOMATED: CPT | Performed by: INTERNAL MEDICINE

## 2018-02-01 PROCEDURE — 94760 N-INVAS EAR/PLS OXIMETRY 1: CPT

## 2018-02-01 PROCEDURE — 94799 UNLISTED PULMONARY SVC/PX: CPT

## 2018-02-01 PROCEDURE — 99232 SBSQ HOSP IP/OBS MODERATE 35: CPT | Performed by: INTERNAL MEDICINE

## 2018-02-01 PROCEDURE — 97110 THERAPEUTIC EXERCISES: CPT

## 2018-02-01 PROCEDURE — 80048 BASIC METABOLIC PNL TOTAL CA: CPT | Performed by: INTERNAL MEDICINE

## 2018-02-01 PROCEDURE — 25010000002 ENOXAPARIN PER 10 MG

## 2018-02-01 PROCEDURE — 25010000002 CEFTRIAXONE PER 250 MG: Performed by: FAMILY MEDICINE

## 2018-02-01 PROCEDURE — 85610 PROTHROMBIN TIME: CPT

## 2018-02-01 PROCEDURE — 94640 AIRWAY INHALATION TREATMENT: CPT

## 2018-02-01 PROCEDURE — 84100 ASSAY OF PHOSPHORUS: CPT | Performed by: INTERNAL MEDICINE

## 2018-02-01 PROCEDURE — 83735 ASSAY OF MAGNESIUM: CPT | Performed by: INTERNAL MEDICINE

## 2018-02-01 RX ORDER — AMOXICILLIN AND CLAVULANATE POTASSIUM 875; 125 MG/1; MG/1
1 TABLET, FILM COATED ORAL EVERY 12 HOURS SCHEDULED
Status: DISCONTINUED | OUTPATIENT
Start: 2018-02-01 | End: 2018-02-05 | Stop reason: HOSPADM

## 2018-02-01 RX ADMIN — POTASSIUM & SODIUM PHOSPHATES POWDER PACK 280-160-250 MG 2 PACKET: 280-160-250 PACK at 11:29

## 2018-02-01 RX ADMIN — OXYMETAZOLINE HYDROCHLORIDE 2 SPRAY: 5 SPRAY NASAL at 20:17

## 2018-02-01 RX ADMIN — METOPROLOL TARTRATE 12.5 MG: 25 TABLET, FILM COATED ORAL at 11:27

## 2018-02-01 RX ADMIN — OXYMETAZOLINE HYDROCHLORIDE 2 SPRAY: 5 SPRAY NASAL at 08:32

## 2018-02-01 RX ADMIN — IPRATROPIUM BROMIDE AND ALBUTEROL SULFATE 3 ML: .5; 3 SOLUTION RESPIRATORY (INHALATION) at 15:19

## 2018-02-01 RX ADMIN — NYSTATIN 1 APPLICATION: 100000 POWDER TOPICAL at 20:17

## 2018-02-01 RX ADMIN — IPRATROPIUM BROMIDE AND ALBUTEROL SULFATE 3 ML: .5; 3 SOLUTION RESPIRATORY (INHALATION) at 02:53

## 2018-02-01 RX ADMIN — FAMOTIDINE 20 MG: 20 TABLET, FILM COATED ORAL at 08:30

## 2018-02-01 RX ADMIN — ENOXAPARIN SODIUM 90 MG: 100 INJECTION SUBCUTANEOUS at 08:31

## 2018-02-01 RX ADMIN — Medication 2 TABLET: at 20:14

## 2018-02-01 RX ADMIN — IPRATROPIUM BROMIDE AND ALBUTEROL SULFATE 3 ML: .5; 3 SOLUTION RESPIRATORY (INHALATION) at 18:52

## 2018-02-01 RX ADMIN — AMOXICILLIN AND CLAVULANATE POTASSIUM 1 TABLET: 875; 125 TABLET, FILM COATED ORAL at 11:27

## 2018-02-01 RX ADMIN — AMIODARONE HYDROCHLORIDE 200 MG: 200 TABLET ORAL at 15:42

## 2018-02-01 RX ADMIN — WARFARIN SODIUM 7 MG: 5 TABLET ORAL at 18:25

## 2018-02-01 RX ADMIN — AMIODARONE HYDROCHLORIDE 200 MG: 200 TABLET ORAL at 22:05

## 2018-02-01 RX ADMIN — FLUTICASONE PROPIONATE 2 SPRAY: 50 SPRAY, METERED NASAL at 08:32

## 2018-02-01 RX ADMIN — CEFTRIAXONE SODIUM 1 G: 1 INJECTION, SOLUTION INTRAVENOUS at 08:31

## 2018-02-01 RX ADMIN — AMOXICILLIN AND CLAVULANATE POTASSIUM 1 TABLET: 875; 125 TABLET, FILM COATED ORAL at 20:14

## 2018-02-01 RX ADMIN — POTASSIUM CHLORIDE 20 MEQ: 750 CAPSULE, EXTENDED RELEASE ORAL at 20:15

## 2018-02-01 RX ADMIN — IPRATROPIUM BROMIDE AND ALBUTEROL SULFATE 3 ML: .5; 3 SOLUTION RESPIRATORY (INHALATION) at 07:04

## 2018-02-01 RX ADMIN — IPRATROPIUM BROMIDE AND ALBUTEROL SULFATE 3 ML: .5; 3 SOLUTION RESPIRATORY (INHALATION) at 23:18

## 2018-02-01 RX ADMIN — AMIODARONE HYDROCHLORIDE 200 MG: 200 TABLET ORAL at 05:48

## 2018-02-01 RX ADMIN — IPRATROPIUM BROMIDE AND ALBUTEROL SULFATE 3 ML: .5; 3 SOLUTION RESPIRATORY (INHALATION) at 12:02

## 2018-02-01 RX ADMIN — DOXYCYCLINE HYCLATE 100 MG: 100 CAPSULE ORAL at 08:30

## 2018-02-01 RX ADMIN — ATORVASTATIN CALCIUM 10 MG: 10 TABLET, FILM COATED ORAL at 08:31

## 2018-02-01 RX ADMIN — POTASSIUM CHLORIDE 20 MEQ: 750 CAPSULE, EXTENDED RELEASE ORAL at 08:31

## 2018-02-01 RX ADMIN — METOPROLOL TARTRATE 12.5 MG: 25 TABLET, FILM COATED ORAL at 20:16

## 2018-02-01 RX ADMIN — NYSTATIN: 100000 POWDER TOPICAL at 08:32

## 2018-02-01 RX ADMIN — ENOXAPARIN SODIUM 90 MG: 100 INJECTION SUBCUTANEOUS at 20:14

## 2018-02-01 NOTE — PLAN OF CARE
Problem: Patient Care Overview (Adult)  Goal: Plan of Care Review  Outcome: Ongoing (interventions implemented as appropriate)   02/01/18 0457   Coping/Psychosocial Response Interventions   Plan Of Care Reviewed With patient   Outcome Evaluation   Outcome Summary/Follow up Plan vss, hr elevated with activity, AF continues, on PO amio now, on Room air, adequate urine output, possible transfer to floor.       Problem: Pressure Ulcer Risk (Tomas Scale) (Adult,Obstetrics,Pediatric)  Goal: Identify Related Risk Factors and Signs and Symptoms  Outcome: Ongoing (interventions implemented as appropriate)   02/01/18 0457   Pressure Ulcer Risk (Tomas Scale)   Related Risk Factors (Pressure Ulcer Risk (Tomas Scale)) body weight extremes;critical care admission;infection;mobility impaired     Goal: Skin Integrity  Outcome: Ongoing (interventions implemented as appropriate)   02/01/18 0457   Pressure Ulcer Risk (Tomas Scale) (Adult,Obstetrics,Pediatric)   Skin Integrity making progress toward outcome

## 2018-02-01 NOTE — PLAN OF CARE
Problem: Patient Care Overview (Adult)  Goal: Plan of Care Review  Outcome: Ongoing (interventions implemented as appropriate)   02/01/18 1613   Coping/Psychosocial Response Interventions   Plan Of Care Reviewed With patient   Patient Care Overview   Progress improving   Outcome Evaluation   Outcome Summary/Follow up Plan Low dose lopressor added per cardiology, HR and BP improved. Tolerating PO amio. Up to chair for shift, ambulated with PT. Awaiting transfer to [facility].

## 2018-02-01 NOTE — PROGRESS NOTES
"INTENSIVIST   PROGRESS NOTE     Hospital:  LOS: 2 days     Ms. Lavern Youngblood, 52 y.o. female is followed for a Chief Complaint of: Afib w/ RVR      Subjective   S   Lavern Youngblood is a 53 yo female who was admitted by the hospitalist group after being transferred from OSH this morning with CAP and AFIB with RVR.     The patient began having a dry cough, congestion, and HERNANDEZ for a couple of days.  On Monday evening she began \"smothering\" and presented to OSH ED. She has a h/o VHD with AVR and MVR as well as chronic AFIB with diastolic HF.  In the ED she was in AFIB with RVR with hypotension as well as a RUL infiltrate noted on CXR.  She had received lasix 60 mg IV for \"volume overload\".     On arrival to [facility] the patient received an additional Lasix 40 mg IV and the patient's respiratory status improved. CXR here was suggestive of RUL PNA as well and she was started on doxy and ceftriaxone for CAP. Dr Wilson was consulted this am as the patient is followed by him as an outpatient.     The patient's BP remained marginal throughout the day and she received a total of 1L NS for support.  Tonight she went back into AFIB with RVR in the 140-160's.  Her SBP dropped into the 70-80's and she received an additional 500 mg NS bolus, 500 mcg digoxin with improvement in pressure but rate still uncontrolled so patient was transferred to ICU.     Dr Wilson was notified and an order for amiodarone bolus with drip was obtained.  Patient is currently comfortable with stabilization of BP.  She denies any chest pain or sob at this time.    Interval History:  Her heart rate is mostly controlled but increases with activity. Blood pressure has improved. She notes that her sinuses are starting to drain.        The patient's relevant past medical, surgical and social history were reviewed and updated in Epic as appropriate.      ROS:   Constitutional: Negative for fever.   Respiratory: Negative for dyspnea.   Cardiovascular: Negative for chest " pain.   Gastrointestinal: Negative for  nausea, vomiting and diarrhea.     Objective   O     Vitals:  Temp  Min: 97.5 °F (36.4 °C)  Max: 98.9 °F (37.2 °C)  BP  Min: 103/68  Max: 140/107  Pulse  Min: 71  Max: 151  Resp  Min: 16  Max: 24  SpO2  Min: 91 %  Max: 97 % No Data Recorded    Intake/Ouptut 24 hrs (7:00AM - 6:59 AM)  Intake & Output (last 3 days)       01/29 0701 - 01/30 0700 01/30 0701 - 01/31 0700 01/31 0701 - 02/01 0700 02/01 0701 - 02/02 0700    P.O.  1260 1260     I.V. (mL/kg)  1919 (21.4) 268.7 (3)     IV Piggyback  482 63     Total Intake(mL/kg)  3661 (40.9) 1591.7 (17.8)     Urine (mL/kg/hr)  2460 (1.1) 1775 (0.8)     Stool   0 (0)     Total Output   2460 1775      Net   +1201 -183.3              Unmeasured Urine Occurrence   4 x     Unmeasured Stool Occurrence   1 x           Medications (drips):         Physical Examination  Telemetry:     Atrial Rhythm: atrial fibrillation      Constitutional:  No acute distress.   Cardiovascular: Normal rate. Irregular rhythm. Normal heart sounds.  No murmurs, gallop or rub.   Respiratory: No respiratory distress. Normal respiratory effort.  Upper airway wheezing.   Clear to ascultation.    Abdominal:  Soft. No masses. Non-tender. No distension. No HSM.   Extremities: No digital cyanosis. No clubbing.  No peripheral edema.   Neurological:   Alert and Oriented to person, place, and time.             Results from last 7 days  Lab Units 02/01/18 0452 01/31/18 0522 01/30/18 2009   WBC 10*3/mm3 19.14* 28.32* 26.54*   HEMOGLOBIN g/dL 13.5 13.1 13.7   MCV fL 85.6 84.2 85.6   PLATELETS 10*3/mm3 267 290 301       Results from last 7 days  Lab Units 02/01/18 0452 01/31/18 0522 01/30/18 2009 01/30/18  0748   SODIUM mmol/L 135 135  --  134   POTASSIUM mmol/L 4.0 4.1  --  3.8   CO2 mmol/L 24.0 22.0  --  21.0   CREATININE mg/dL 0.60 0.60  --  0.80   GLUCOSE mg/dL 98 124*  --  151*   MAGNESIUM mg/dL 2.2 2.2 2.1 2.1   PHOSPHORUS mg/dL 1.6* 1.8*  --   --      Estimated  Creatinine Clearance: 121.7 mL/min (by C-G formula based on Cr of 0.6).    Results from last 7 days  Lab Units 01/30/18  0748   ALK PHOS U/L 75   BILIRUBIN mg/dL 1.0   ALT (SGPT) U/L 20   AST (SGOT) U/L 32         Results from last 7 days  Lab Units 01/30/18 2005   PH, ARTERIAL pH units 7.478*   PCO2, ARTERIAL mm Hg 28.7*   PO2 ART mm Hg 63.4*   FIO2 % 21       Images:    No new imaging.          Results: Reviewed.  I reviewed the patient's new laboratory and imaging results.  I independently reviewed the patient's new images.    Medications: Reviewed.    Assessment/Plan   A / P     Ms. Youngblood is a 51yo F with a history of tobacco abuse who was admitted with CAP and developed Afib w/ RVR necessitating transfer to the ICU. She is now on an Amiodarone drip with fairly good control of her heart rate. She is very congested with sinus tenderness. She does not have a history of COPD.     Nutrition:   Diet Regular; Cardiac  Advance Directives: Full Code    Hospital Problem List     * (Principal)A-fib with RVR    VHD (valvular heart disease) s/p AVR, MVR 2011    Acute on chronic diastolic congestive heart failure    Former smoker, quit 2010    Anticoagulated on Coumadin    H/O: CVA with residual memory loss, mild right sided weakness, 2010    Subtherapeutic international normalized ratio (INR)    Improved RUL Infiltrate (R/O CAP, atelectasis)          Assessment / Plan:    1. Change antibiotics to Augmentin for sinusitis as there is no evidence of pneumonia  2. Afrin x 3 days  3. Continue corticosteroid nasal spray  4. Incentive spirometer  5. Continue Amiodarone and Warfarin  6. Okay to transfer back to telemetry when a bed is available.     Plan of care and goals reviewed during interdisciplinary rounds.  I discussed the patient's findings and my recommendations with patient and nursing staff    Time: was greater than 25 minutes.    Gabi Pastrana DO    Intensive Care Medicine and Pulmonary Medicine

## 2018-02-01 NOTE — PROGRESS NOTES
"Pharmacy Consult  -  Warfarin    Lavern Youngblood is a  52 y.o. female   Height - 162.6 cm (64\")  Weight - 88 kg89.6 kg (197 lb 8 oz)    Consulting Provider: - Britt Cantu MD  Indication: - Mechanical Mitral & Aortic Valve, AFib  Goal INR: - 2.5-3.5  Home Regimen:   - Warfarin 7 mg daily     Bridge Therapy: Yes   and enoxapain 90 mg SQ q12h     Drug-Drug Interactions with current regimen:   Amiodarone - increase risk of bleeding (started 1/30/18)              Augmentin - increase risk of bleeding              Enoxaparin - increase risk of bleeding    Warfarin Dosing During Admission:    Date  1/30 1/31 2/1         INR  1.28 1.42 1.75         Dose  6 mg 6 mg (7 mg)              Education Provided:  Follow-up during admission    Labs:  Results from last 7 days   Lab Units 02/01/18 0452 01/31/18 0522 01/30/18 2009 01/30/18  0748   INR  1.75 1.42 --  1.28   HEMOGLOBIN g/dL 13.5 13.1 13.7 15.0   HEMATOCRIT % 41.7 40.1 42.1 44.4*   PLATELETS 10*3/mm3 267 290 301 329   Results from last 7 days   Lab Units 02/01/18 0452 01/31/18 0522 01/30/18  0748   SODIUM mmol/L 135 135 134   POTASSIUM mmol/L 4.0 4.1 3.8   CHLORIDE mmol/L 106 106 102   CO2 mmol/L 24.0 22.0 21.0   BUN mg/dL 18 17 17   CREATININE mg/dL 0.60 0.60 0.80   CALCIUM mg/dL 10.4 10.2 10.7*   BILIRUBIN mg/dL --  --  1.0   ALK PHOS U/L --  --  75   ALT (SGPT) U/L --  --  20   AST (SGOT) U/L --  --  32   GLUCOSE mg/dL 98 124* 151*     Current dietary intake: 75% intake documented    Diet Order   Procedures   • Diet Regular; Cardiac     Assessment/Plan:     1.  INR remains subtherapeutic today at 1.75, but continues to increase. Goal INR 2.5-3.5. Patient continues to be bridged with enoxaparin 1 mg/kg SQ q12h.  2.  Patient is receiving numerous medications that can increase risk of bleeding, most notably amiodarone which was started 1/30/18. Anticipated onset of drug-drug interaction between amiodarone and warfarin is 3-7 days. Since INR is subtherapeutic today and " patient states she takes warfarin 7 mg daily, will change dose to reflect patient's home regimen of warfarin 7 mg daily. Follow INR closely in anticipation of increase in INR due to drug interaction with amiodarone.    3. Pharmacy will continue to monitor and adjust warfarin dose as necessary based on labs, drug interactions, diet, and clinical status.     Thank you,  Jerilyn Hull RPH  2/1/2018  12:28 PM

## 2018-02-01 NOTE — PROGRESS NOTES
Multidisciplinary Rounds    Time: 20min  Patient Name: Lavern Youngblood  Date of Encounter: 02/01/18 9:35 AM  MRN: [id number]  Admission date: 1/30/2018      Reason for visit: MDR. RD to continue to follow per protocol.       Current diet: Diet Regular; Cardiac    MST Total Score: MST score: 0    Intervention:  Follow treatment plan  Care plan reviewed    Follow up:   Per protocol      Verona Qiu RDN, SERGIO  9:35 AM

## 2018-02-01 NOTE — THERAPY TREATMENT NOTE
Acute Care - Physical Therapy Treatment Note  [facility]     Patient Name: Lavern Youngblood  : 1965  MRN: [id number]  Today's Date: 2018  Onset of Illness/Injury or Date of Surgery Date: 18  Date of Referral to PT: 18  Referring Physician: ALLA Cook    Admit Date: 2018    Visit Dx:    ICD-10-CM ICD-9-CM   1. 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   • H/O: CVA with residual memory loss, mild right sided weakness,    • Subtherapeutic international normalized ratio (INR)   • Improved RUL Infiltrate (R/O CAP, atelectasis)               Adult Rehabilitation Note       18 1600 18 1539       Rehab Assessment/Intervention    Discipline --  -KR physical therapist  -KR     Document Type --  -KR therapy note (daily note)  -KR     Subjective Information --  -KR agree to therapy;no complaints  -KR     Patient Effort, Rehab Treatment --  -KR excellent  -KR     Symptoms Noted During/After Treatment --  -KR significant change in vital signs  -KR     Symptoms Noted Comment --  -KR Elevated HR with increased activity  -KR     Precautions/Limitations --  -KR fall precautions;cardiac precautions  -KR     Recorded by [KR] Kiarra Holcomb, PT [KR] Kiarra Holcomb PT     Vital Signs    Pre Systolic BP Rehab --  -  -KR     Pre Treatment Diastolic BP --  -KR 90  -KR     Post Systolic BP Rehab --  -  -KR     Post Treatment Diastolic BP --  -KR 96  -KR     Pretreatment Heart Rate (beats/min) --  -KR 87  -KR     Intratreatment Heart Rate (beats/min) --  -  -KR     Posttreatment Heart Rate (beats/min) --  -  -KR     Pre SpO2 (%) --  -KR 96  -KR     O2 Delivery  Pre Treatment --  -KR room air  -KR     Intra SpO2 (%) --  -KR 95  -KR     O2 Delivery Intra Treatment --  -KR room air  -KR     Post SpO2 (%) --  -KR 97  -KR     O2 Delivery Post Treatment --  -KR room air  -KR     Pre Patient Position --  -KR Sitting  -KR     Intra Patient Position --  -KR Standing  -KR     Post Patient Position --  -KR Sitting  -KR     Recorded by [KR] Kiarra Holcomb, PT [KR] Kiarra Holcomb, PT     Pain Assessment    Pain Assessment --  -KR 0-10  -KR     Pain Score --  -KR 0  -KR     Post Pain Score --  -KR 0  -KR     Recorded by [KR] Kiarra Holcomb, PT [KR] Kiarra Holcomb, PT     Cognitive Assessment/Intervention    Current Cognitive/Communication Assessment --  -KR functional  -KR     Orientation Status --  -KR oriented x 4  -KR     Follows Commands/Answers Questions --  -KR able to follow single-step instructions;100% of the time  -KR     Personal Safety --  -KR WNL/WFL  -KR     Personal Safety Interventions --  -KR fall prevention program maintained;gait belt;nonskid shoes/slippers when out of bed  -KR     Recorded by [KR] Kiarra Holcomb, PT [KR] Kiarra Holcomb, PT     Bed Mobility, Assessment/Treatment    Bed Mob, Supine to Sit, [place]  not tested  -KR     Bed Mob, Sit to Supine, [place]  not tested  -KR     Bed Mobility, Comment  UIC  -KR     Recorded by  [KR] Kiarra Holcomb, PT     Transfer Assessment/Treatment    Transfers, Sit-Stand [place]  contact guard assist;verbal cues required  -KR     Transfers, Stand-Sit [place]  contact guard assist;verbal cues required  -KR     Transfers, Sit-Stand-Sit, Assist Device  rolling walker  -KR     Transfer, Safety Issues  step length decreased  -KR     Transfer, Comment  Pt given VC's for correct hand placement.   -KR     Recorded by  [KR] Kiarra Holcomb, PT     Gait Assessment/Treatment    Gait, [place] Level  contact guard assist;1 person + 1 person to manage equipment;verbal cues required  -KR     Gait, Assistive Device   rolling walker  -KR     Gait, Distance (Feet)  300  -KR     Gait, Gait Pattern Analysis  swing-through gait  -KR     Gait, Gait Deviations  anna decreased;forward flexed posture;step length decreased  -KR     Gait, Safety Issues  step length decreased  -KR     Gait, Impairments  strength decreased  -KR     Gait, Comment  Pt demonstrated slow gait speed with decreased step length, but had no periods of decreased balance. O2 sats remained in mid 90s; elevated HR noted with increased mobility.   -KR     Recorded by  [UZIEL] Kiarra Holcomb PT     Motor Skills/Interventions    Additional Documentation  Balance Skills Training (Group)  -KR     Recorded by  [UZIEL] Kiarra Holcomb PT     Balance Skills Training    Sitting-Level of Assistance  Distant supervision  -KR     Sitting-Balance Support  Feet supported  -KR     Standing-Level of Assistance  Contact guard  -KR     Static Standing Balance Support  assistive device  -KR     Gait Balance-Level of Assistance  Contact guard  -KR     Gait Balance Support  assistive device  -KR     Recorded by  [UZIEL] Kiarra Holcomb PT     Therapy Exercises    Bilateral Lower Extremities  AROM:;10 reps;sitting;ankle pumps/circles;hip flexion;LAQ  -KR     Recorded by  [UZIEL] Kiarra Holcomb PT     Positioning and Restraints    Pre-Treatment Position  sitting in chair/recliner  -KR     Post Treatment Position  chair  -KR     In Chair  notified nsg;reclined;call light within reach;encouraged to call for assist;waffle cushion;legs elevated  -KR     Recorded by  [UZIEL] Kiarra Holcomb PT       User Key  (r) = Recorded By, (t) = Taken By, (c) = Cosigned By    Initials Name Effective Dates    UZIEL Holcomb PT 09/25/17 -                 IP PT Goals       02/01/18 1623 01/31/18 1349       Bed Mobility PT LTG    Bed Mobility PT LTG, Date Established  01/31/18  -LS     Bed Mobility PT LTG, Time to Achieve  2 wks  -LS     Bed Mobility PT LTG, Activity Type  supine to sit/sit to supine  -LS     Bed  Mobility PT LTG, [place] Level  independent  -LS     Bed Mobility PT LTG, Outcome goal ongoing  -KR      Transfer Training PT LTG    Transfer Training PT LTG, Date Established  01/31/18  -LS     Transfer Training PT LTG, Time to Achieve  2 wks  -LS     Transfer Training PT LTG, Activity Type  sit to stand/stand to sit  -LS     Transfer Training PT LTG, [place] Level  conditional independence  -LS     Transfer Training PT LTG, Outcome goal ongoing  -KR      Gait Training PT LTG    Gait Training Goal PT LTG, Date Established  01/31/18  -LS     Gait Training Goal PT LTG, Time to Achieve  2 wks  -LS     Gait Training Goal PT LTG, [place] Level  conditional independence  -LS     Gait Training Goal PT LTG, Assist Device  walker, rolling  -LS     Gait Training Goal PT LTG, Distance to Achieve  400  -LS     Gait Training Goal PT LTG, Outcome goal ongoing  -KR      Stair Training PT LTG    Stair Training Goal PT LTG, Date Established  01/31/18  -LS     Stair Training Goal PT LTG, Time to Achieve  2 wks  -LS     Stair Training Goal PT LTG, Number of Steps  2  -LS     Stair Training Goal PT LTG, [place] Level  supervision required  -LS     Stair Training Goal PT LTG, Assist Device  1 handrail  -LS     Stair Training Goal PT LTG, Outcome goal ongoing  -KR        User Key  (r) = Recorded By, (t) = Taken By, (c) = Cosigned By    Initials Name Provider Type    DIMITRIOS Atkinson, PT Physical Therapist    UZIEL Holcomb, PT Physical Therapist          Physical Therapy Education     Title: PT OT SLP Therapies (Active)     Topic: Physical Therapy (Active)     Point: Mobility training (Active)    Learning Progress Summary    Learner Readiness Method Response Comment Documented by Status   Patient Acceptance E NR  KR 02/01/18 1623 Active    Acceptance E,D NR  LS 01/31/18 1349 Active               Point: Home exercise program (Active)    Learning Progress Summary    Learner Readiness Method Response Comment  Documented by Status   Patient Acceptance E NR  KR 02/01/18 1623 Active    Acceptance E,D NR  LS 01/31/18 1349 Active               Point: Body mechanics (Active)    Learning Progress Summary    Learner Readiness Method Response Comment Documented by Status   Patient Acceptance E NR  KR 02/01/18 1623 Active    Acceptance E,D NR  LS 01/31/18 1349 Active               Point: Precautions (Active)    Learning Progress Summary    Learner Readiness Method Response Comment Documented by Status   Patient Acceptance E NR  KR 02/01/18 1623 Active    Acceptance E,D NR  LS 01/31/18 1349 Active                      User Key     Initials Effective Dates Name Provider Type Discipline    LS 06/19/15 -  Joycelyn Atkinson, PT Physical Therapist PT    KR 09/25/17 -  Kiarra Holcomb, PT Physical Therapist PT                    PT Recommendation and Plan  Anticipated Equipment Needs At Discharge: front wheeled walker  Anticipated Discharge Disposition: home with assist  PT Frequency: daily  Plan of Care Review  Plan Of Care Reviewed With: patient  Progress: improving  Outcome Summary/Follow up Plan: Pt increased ambulation distance to 300ft with RW and CGA. Pt demonstrated good safety awareness and balance; VC's provided for increased step length and upright posture. Pt limited by periods of elevated HR with increased mobility.           Outcome Measures       02/01/18 1539 01/31/18 1102       How much help from another person do you currently need...    Turning from your back to your side while in flat bed without using bedrails? 3  -KR 3  -LS     Moving from lying on back to sitting on the side of a flat bed without bedrails? 3  -KR 3  -LS     Moving to and from a bed to a chair (including a wheelchair)? 3  -KR 3  -LS     Standing up from a chair using your arms (e.g., wheelchair, bedside chair)? 3  -KR 3  -LS     Climbing 3-5 steps with a railing? 3  -KR 3  -LS     To walk in hospital room? 3  -KR 3  -LS     AM-PAC 6 Clicks Score 18  -KR  18  -LS     Functional Assessment    Outcome Measure Options AM-PAC 6 Clicks Basic Mobility (PT)  -KR AM-PAC 6 Clicks Basic Mobility (PT)  -LS       User Key  (r) = Recorded By, (t) = Taken By, (c) = Cosigned By    Initials Name Provider Type    LS Joycelyn Atkinson, PT Physical Therapist    KR Kiarra Holcomb, PT Physical Therapist           Time Calculation:         PT Charges       02/01/18 1625          Time Calculation    Start Time 1539  -KR      PT Received On 02/01/18  -KR      PT Goal Re-Cert Due Date 02/10/18  -KR      Time Calculation- PT    Total Timed Code Minutes- PT 23 minute(s)  -KR        User Key  (r) = Recorded By, (t) = Taken By, (c) = Cosigned By    Initials Name Provider Type    UZIEL Holcomb, PT Physical Therapist          Therapy Charges for Today     Code Description Service Date Service Provider Modifiers Qty    36110766688 HC PT THER PROC EA 15 MIN 2/1/2018 Kiarra Holcomb, PT GP 2    48999495162 HC PT THER SUPP EA 15 MIN 2/1/2018 Kiarra Holcomb, PT GP 2          PT G-Codes  Outcome Measure Options: AM-PAC 6 Clicks Basic Mobility (PT)    Rahel Holcomb PT  2/1/2018

## 2018-02-01 NOTE — PLAN OF CARE
Problem: Patient Care Overview (Adult)  Goal: Plan of Care Review  Outcome: Ongoing (interventions implemented as appropriate)   02/01/18 1623   Coping/Psychosocial Response Interventions   Plan Of Care Reviewed With patient   Patient Care Overview   Progress improving   Outcome Evaluation   Outcome Summary/Follow up Plan Pt increased ambulation distance to 300ft with RW and CGA. Pt demonstrated good safety awareness and balance; VC's provided for increased step length and upright posture. Pt limited by periods of elevated HR with increased mobility.        Problem: Inpatient Physical Therapy  Goal: Bed Mobility Goal LTG- PT  Outcome: Ongoing (interventions implemented as appropriate)   01/31/18 1349 02/01/18 1623   Bed Mobility PT LTG   Bed Mobility PT LTG, Date Established 01/31/18 --    Bed Mobility PT LTG, Time to Achieve 2 wks --    Bed Mobility PT LTG, Activity Type supine to sit/sit to supine --    Bed Mobility PT LTG, [place] Level independent --    Bed Mobility PT LTG, Outcome --  goal ongoing     Goal: Transfer Training Goal 1 LTG- PT  Outcome: Ongoing (interventions implemented as appropriate)   01/31/18 1349 02/01/18 1623   Transfer Training PT LTG   Transfer Training PT LTG, Date Established 01/31/18 --    Transfer Training PT LTG, Time to Achieve 2 wks --    Transfer Training PT LTG, Activity Type sit to stand/stand to sit --    Transfer Training PT LTG, [place] Level conditional independence --    Transfer Training PT LTG, Outcome --  goal ongoing     Goal: Gait Training Goal LTG- PT  Outcome: Ongoing (interventions implemented as appropriate)   01/31/18 1349 02/01/18 1623   Gait Training PT LTG   Gait Training Goal PT LTG, Date Established 01/31/18 --    Gait Training Goal PT LTG, Time to Achieve 2 wks --    Gait Training Goal PT LTG, [place] Level conditional independence --    Gait Training Goal PT LTG, Assist Device walker, rolling --    Gait Training Goal PT LTG, Distance to  Achieve 400 --    Gait Training Goal PT LTG, Outcome --  goal ongoing     Goal: Stair Training Goal LTG- PT  Outcome: Ongoing (interventions implemented as appropriate)   01/31/18 1349 02/01/18 1623   Stair Training PT LTG   Stair Training Goal PT LTG, Date Established 01/31/18 --    Stair Training Goal PT LTG, Time to Achieve 2 wks --    Stair Training Goal PT LTG, Number of Steps 2 --    Stair Training Goal PT LTG, [place] Level supervision required --    Stair Training Goal PT LTG, Assist Device 1 handrail --    Stair Training Goal PT LTG, Outcome --  goal ongoing

## 2018-02-01 NOTE — DISCHARGE PLACEMENT REQUEST
"Lavern Youngblood (52 y.o. Female)Please call back to  at [phone].  Prisca Shepherd RN  Patient is in room N220      [facility] 2A ICU  [address] [facility] [address]  Phone:  [phone]  Fax:   Date Ordered: 2018         Patient:  Lavern Youngblood MRN:  [id number]   [address]   [place] [address] :  1965  SSN:    Phone: [phone] Sex:  F     Weight: 89.6 kg (197 lb 8 oz)         Ht Readings from Last 1 Encounters:   18 162.6 cm (64\")         Walker                         (Order ID: [id number])    Diagnosis:  Impaired functional mobility, balance, gait, and endurance (Z74.09 [ICD-10-CM] V49.89 [ICD-9-CM])   Quantity:  1     Equipment:  Walker Folding with Wheels  Length of Need (99 Months = Lifetime): 99 Months = Lifetime            Verbal Order Mode: Verbal with readback   Authorizing Provider: ALLA [facility]  Authorizing Provider's NPI: [id number]     Order Entered By: Prisca Shepherd RN 2018 11:48 AM     Electronically signed by:               Date of Birth Social Security Number Address Home Phone MRN    1965  [address]   [place] [id number] [phone] [id number]    Yazdanism Marital Status          None        Admission Date Admission Type Admitting Provider Attending Provider Department, Room/Bed    18 Elective Jerome Chang MD Thompson, John Randall, MD [facility] 2A ICU, N220/1    Discharge Date Discharge Disposition Discharge Destination                      Attending Provider: Jerome Chang MD     Allergies:  No Known Allergies    Isolation:  None   Infection:  None   Code Status:  FULL    Ht:  162.6 cm (64\")   Wt:  89.6 kg (197 lb 8 oz)    Admission Cmt:  None   Principal Problem:  A-fib with RVR [I48.91]                 Active Insurance as of 2018     Primary Coverage     Payor Plan Insurance Group Employer/Plan Group    MEDICARE MEDICARE A & B   "    Payor Plan Address Payor Plan Phone Number Effective From Effective To    [address] [phone] [address]     [place] [address]       Subscriber Name Subscriber Birth Date Member ID       SORAYA CLARK 1965 [id number]                 Emergency Contacts      (Rel.) Home Phone Work Phone Mobile Phone    Lee Ann Sandoval (Daughter) [phone] -- --            Insurance Information                MEDICARE/MEDICARE A & B Phone: [phone]    Subscriber: Soraya Clark Subscriber#: [id number]    Group#:  Precert#:              History & Physical      Britt Cantu MD at 2018  1:18 PM              [facility] Medicine Services  HISTORY AND PHYSICAL    Patient Name: Soraya Clark  : 1965  MRN: [id number]  Primary Care Physician: No Known Provider    Subjective   Subjective     Chief Complaint:  SOA    HPI:  Soraya Clark is a 52 y.o. 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  She presented to OSH ED with 2-3 days of dry cough, congestion and increasing HERNANDEZ.  Patient also noted associated increased BLE edema despite taking her home Lasix dose.  At OSH ED found to have RUL pneumonia and was in Afib with RVR.  Her BP's were low but improved with IVF's and correction of RVR to promote better effective stroke volumes.  Transferred to [facility] for evaluation and treatment as Dr. Wilson is her primary Cardiologist.    Review of Systems   Constitutional: Positive for chills. Negative for fever.   HENT: Positive for congestion.    Respiratory: Positive for cough, chest tightness, shortness of breath and wheezing.    Cardiovascular: Positive for leg swelling. Negative for chest pain and palpitations.      Otherwise 10-system ROS reviewed and is negative except as mentioned in the HPI.    Personal History     No past medical history on file.    No past  surgical history on file.    Family History: family history is not on file.     Social History:  reports that she quit smoking about 8 years ago. Her smoking use included Cigarettes. She has never used smokeless tobacco.  Social History     Social History Narrative   • No narrative on file       Medications:  Prescriptions Prior to Admission   Medication Sig Dispense Refill Last Dose   • atorvastatin (LIPITOR) 10 MG tablet Take 10 mg by mouth Daily.      • carvedilol (COREG) 3.125 MG tablet Take 3.125 mg by mouth 2 (Two) Times a Day.      • furosemide (LASIX) 40 MG tablet Take 40 mg by mouth Daily.      • potassium chloride (K-DUR,KLOR-CON) 20 MEQ CR tablet Take 20 mEq by mouth 2 (Two) Times a Day.      • raNITIdine (ZANTAC) 300 MG tablet Take 300 mg by mouth 2 (Two) Times a Day.      • sacubitril-valsartan (ENTRESTO) 49-51 MG tablet Take 1 tablet by mouth 2 (Two) Times a Day.      • warfarin (COUMADIN) 6 MG tablet Take 6 mg by mouth Daily.          No Known Allergies    Objective   Objective     Vital Signs:   Temp:  [98.1 °F (36.7 °C)] 98.1 °F (36.7 °C)  Heart Rate:  [] 114  Resp:  [16-18] 16  BP: ()/(49-75) 103/75        Physical Exam   Constitutional: No acute distress, awake, alert, mildy ill appearing, obese body habitus  Eyes: Pupils equal, sclerae anicteric, no conjunctival injection  HENT: NCAT, mucous membranes moist  Respiratory:RUL/RML rales, bronchiole sounds, mild wheexes inposterior fields, dyspneic with conversation  Cardiovascular: IRRR, 's  Musculoskeletal: 2+ peripheral edema to midtibia BLE, normal muscle tone for age, no clubbing or cyanosis to extremities  Psychiatric: Appropriate affect, good insight and judgement, cooperative  Skin: No rashes, no jaundice, no petechiae, no mottling      Results Reviewed:  I have personally reviewed current lab, radiology, and data and agree.      Results from last 7 days  Lab Units 01/30/18  0748   WBC 10*3/mm3 9.50   HEMOGLOBIN g/dL 15.0    HEMATOCRIT % 44.4*   PLATELETS 10*3/mm3 329   INR  1.28       Results from last 7 days  Lab Units 01/30/18  0748   SODIUM mmol/L 134   POTASSIUM mmol/L 3.8   CHLORIDE mmol/L 102   CO2 mmol/L 21.0   BUN mg/dL 17   CREATININE mg/dL 0.80   GLUCOSE mg/dL 151*   CALCIUM mg/dL 10.7*   ALT (SGPT) U/L 20   AST (SGOT) U/L 32   TROPONIN I ng/mL 0.043*     CrCl cannot be calculated (Unknown ideal weight.).  Brief Urine Lab Results     None        BNP   Date Value Ref Range Status   01/30/2018 707.0 (H) 0.0 - 100.0 pg/mL Final     No results found for: PHART  Imaging Results (last 24 hours)     Procedure Component Value Units Date/Time    XR Chest 1 View [[id number]] Collected:  01/30/18 0926     Updated:  01/30/18 0927    Narrative:       EXAMINATION: XR CHEST 1 VW-01/30/2018:      INDICATION: SOA, per OSH, ? RUL pneumonia.      COMPARISON: 12/03/2011.     FINDINGS: Sternotomy wires and cardiac valve prosthesis are again noted.  The heart is upper limits of normal size. Mild patchy interstitial  changes in the right upper lobe are suspicious for developing pneumonia.  No effusion or pneumothorax is seen.           Impression:       Suspected right upper lobe pneumonia.     D:  01/30/2018  E:  01/30/2018                      Assessment/Plan   Assessment / Plan     Hospital Problem List     Pneumonia    A-fib with RVR    S/P AVR (aortic valve replacement)    S/P MVR (mitral valve replacement)    Acute on chronic diastolic and valvular congestive heart failure    Former smoker, quit 2010    Anticoagulated on Coumadin    Rheumatic valvular disease as reason for MVR and AVR    H/O: CVA (cerebrovascular accident)    Subtherapeutic international normalized ratio (INR)            Assessment & Plan:  - IV CTX and Doxy for RUL CAP  - duonebs and PO prednisone  - On Dilt gtt, will restart home Coreg.  Wean gtt as tolerates, hopefully will improve as resp status improves   - s/p IV Lasix at OSH and again x1 upon arrival here, will resume  her daily home PO dose  - Dr. Wilsno (pt's primary Cardiologist) will follow  - Pharmacy to dose Coumadin/Lovenox as currently subtherapeutic    DVT prophylaxis: Coumadin/Lovenox    CODE STATUS:  Full Code    Admission Status:  I believe this patient meets INPATIENT status due to the need for care which can only be reasonably provided in an hospital setting such as aggressive/expedited ancillary services and/or consultation services, the necessity for IV medications, close physician monitoring and/or the possible need for procedures.  In such, I feel patient’s risk for adverse outcomes and need for care warrant INPATIENT evaluation and predict the patient’s care encounter to likely last beyond 2 midnights.      Britt Cantu MD  01/30/18   1:35 PM           Electronically signed by Britt Cantu MD at 1/30/2018  1:38 PM

## 2018-02-01 NOTE — PROGRESS NOTES
[facility]       LOS: 2 days   Patient Care Team:  No Known Provider as PCP - General        Subjective       Patient Denies:  Cp, sob, palpitations.      Vital Signs  Temp:  [97.5 °F (36.4 °C)-98.9 °F (37.2 °C)] 97.6 °F (36.4 °C)  Heart Rate:  [] 77  Resp:  [16-22] 20  BP: (103-127)/(64-95) 116/79    Intake/Output Summary (Last 24 hours) at 02/01/18 1004  Last data filed at 02/01/18 0600   Gross per 24 hour   Intake           1225.7 ml   Output             1625 ml   Net           -399.3 ml          Physical Exam:     General Appearance:    Alert, cooperative, in no acute distress       Neck:   No adenopathy, supple, trachea midline, no thyromegaly, no JVD       Lungs:     Clear to auscultation,respirations regular, even and                  unlabored    Heart:    Irregular rhythm and normal rate, normal S1 and S2, no            murmur, no gallop, no rub, no click   Chest Wall:    No abnormalities observed   Abdomen:     Normal bowel sounds, no masses, no organomegaly, soft        non-tender, non-distended       Extremities:   Moves all extremities well, no edema, no cyanosis, no             redness   Pulses:   Pulses palpable and equal bilaterally     Results Review:     I reviewed the patient's new clinical results.      WBC WBC   Date/Time Value Ref Range Status   02/01/2018 0452 19.14 (H) 3.50 - 10.80 10*3/mm3 Final   01/31/2018 0522 28.32 (H) 3.50 - 10.80 10*3/mm3 Final   01/30/2018 2009 26.54 (H) 3.50 - 10.80 10*3/mm3 Final   01/30/2018 0748 9.50 3.50 - 10.80 10*3/mm3 Final            HGB Hemoglobin   Date/Time Value Ref Range Status   02/01/2018 0452 13.5 11.5 - 15.5 g/dL Final   01/31/2018 0522 13.1 11.5 - 15.5 g/dL Final   01/30/2018 2009 13.7 11.5 - 15.5 g/dL Final   01/30/2018 0748 15.0 11.5 - 15.5 g/dL Final           HCT Hematocrit   Date/Time Value Ref Range Status   02/01/2018 0452 41.7 34.5 - 44.0 % Final   01/31/2018 0522 40.1  34.5 - 44.0 % Final   01/30/2018 2009 42.1 34.5 - 44.0 % Final   01/30/2018 0748 44.4 (H) 34.5 - 44.0 % Final            Platlets No results found for: LABPLAT  Sodium  Sodium   Date/Time Value Ref Range Status   02/01/2018 0452 135 132 - 146 mmol/L Final   01/31/2018 0522 135 132 - 146 mmol/L Final   01/30/2018 0748 134 132 - 146 mmol/L Final     Potassium  Potassium   Date/Time Value Ref Range Status   02/01/2018 0452 4.0 3.5 - 5.5 mmol/L Final   01/31/2018 0522 4.1 3.5 - 5.5 mmol/L Final   01/30/2018 0748 3.8 3.5 - 5.5 mmol/L Final     Chloride  Chloride   Date/Time Value Ref Range Status   02/01/2018 0452 106 99 - 109 mmol/L Final   01/31/2018 0522 106 99 - 109 mmol/L Final   01/30/2018 0748 102 99 - 109 mmol/L Final     BicarbonateNo results found for: PLASMABICARB    BUN BUN   Date/Time Value Ref Range Status   02/01/2018 0452 18 9 - 23 mg/dL Final   01/31/2018 0522 17 9 - 23 mg/dL Final   01/30/2018 0748 17 9 - 23 mg/dL Final      Creatinine Creatinine   Date/Time Value Ref Range Status   02/01/2018 0452 0.60 0.60 - 1.30 mg/dL Final   01/31/2018 0522 0.60 0.60 - 1.30 mg/dL Final   01/30/2018 0748 0.80 0.60 - 1.30 mg/dL Final      Calcium Calcium   Date/Time Value Ref Range Status   02/01/2018 0452 10.4 8.7 - 10.4 mg/dL Final   01/31/2018 0522 10.2 8.7 - 10.4 mg/dL Final   01/30/2018 0748 10.7 (H) 8.7 - 10.4 mg/dL Final      Mag Magnesium   Date/Time Value Ref Range Status   02/01/2018 0452 2.2 1.3 - 2.7 mg/dL Final   01/31/2018 0522 2.2 1.3 - 2.7 mg/dL Final   01/30/2018 2009 2.1 1.3 - 2.7 mg/dL Final   01/30/2018 0748 2.1 1.3 - 2.7 mg/dL Final           PT/INR:    Protime   Date Value Ref Range Status   02/01/2018 19.4 (H) 9.6 - 11.5 Seconds Final   01/31/2018 15.6 (H) 9.6 - 11.5 Seconds Final   01/30/2018 14.1 (H) 9.6 - 11.5 Seconds Final   /  INR   Date Value Ref Range Status   02/01/2018 1.75  Final   01/31/2018 1.42  Final   01/30/2018 1.28  Final     Troponin I   Lab Results   Component Value Date     TROPONINI 1.480 (C) 01/31/2018         amiodarone 200 mg Oral Q8H   Followed by      [START ON 2/7/2018] amiodarone 200 mg Oral Q12H   Followed by      [START ON 2/22/2018] amiodarone 200 mg Oral Daily   atorvastatin 10 mg Oral Daily   ceftriaxone 1 g Intravenous Q24H   doxycycline 100 mg Oral Q12H   enoxaparin 90 mg Subcutaneous Q12H   famotidine 20 mg Oral Daily   fluticasone 2 spray Each Nare Daily   ipratropium-albuterol 3 mL Nebulization Q4H - RT   nystatin  Topical Q12H   oxymetazoline 2 spray Each Nare BID   Pharmacy to Dose enoxaparin (LOVENOX)  Does not apply Daily   Pharmacy to dose warfarin  Does not apply Daily   potassium chloride 20 mEq Oral BID   sennosides-docusate sodium 2 tablet Oral Nightly   warfarin 6 mg Oral Daily       diltiaZEM 5-15 mg/hr Last Rate: 5 mg/hr (01/30/18 1004)       Assessment/Plan     Patient Active Problem List   Diagnosis Code   • A-fib with RVR I48.91   • VHD (valvular heart disease) s/p AVR, MVR 2011 I38   • Acute on chronic diastolic congestive heart failure I50.33   • Former smoker, quit 2010 Z87.891   • Anticoagulated on Coumadin Z51.81, Z79.01   • Rheumatic valvular disease as reason for MVR and AVR I09.1   • H/O: CVA with residual memory loss, mild right sided weakness, 2010 Z86.73   • Subtherapeutic international normalized ratio (INR) R79.1   • Improved RUL Infiltrate (R/O CAP, atelectasis) R91.8     Progressing  Add low dose beta blocker    DENNIS Tubbs  02/01/18  10:04 AM

## 2018-02-01 NOTE — PROGRESS NOTES
Continued Stay Note  [facility]     Patient Name: Lavern Youngblood  MRN: [id number]  Today's Date: 2/1/2018    Admit Date: 1/30/2018          Discharge Plan       02/01/18 1154    Case Management/Social Work Plan    Plan Home    Patient/Family In Agreement With Plan yes    Additional Comments Spoke with patient at bedside.  Patient clarified that she does not have a walker.  She had borrowed one in the past but no longer has it.  Per PT recommendation patient needs a walker.  Patient states that her  and daughter will be assisting her as needed.  Daughter lives with patient and her .  Walker ordered.  CM will continue to follow.              Discharge Codes     None        Expected Discharge Date and Time     Expected Discharge Date Expected Discharge Time    Feb 4, 2018             Prisca Shepherd RN

## 2018-02-02 LAB
ANION GAP SERPL CALCULATED.3IONS-SCNC: 10 MMOL/L (ref 3–11)
BUN BLD-MCNC: 14 MG/DL (ref 9–23)
BUN/CREAT SERPL: 20 (ref 7–25)
CALCIUM SPEC-SCNC: 9.6 MG/DL (ref 8.7–10.4)
CHLORIDE SERPL-SCNC: 102 MMOL/L (ref 99–109)
CO2 SERPL-SCNC: 21 MMOL/L (ref 20–31)
CREAT BLD-MCNC: 0.7 MG/DL (ref 0.6–1.3)
DEPRECATED RDW RBC AUTO: 46.3 FL (ref 37–54)
ERYTHROCYTE [DISTWIDTH] IN BLOOD BY AUTOMATED COUNT: 14.8 % (ref 11.3–14.5)
GFR SERPL CREATININE-BSD FRML MDRD: 89 ML/MIN/1.73
GLUCOSE BLD-MCNC: 80 MG/DL (ref 70–100)
HCT VFR BLD AUTO: 41.3 % (ref 34.5–44)
HGB BLD-MCNC: 13.1 G/DL (ref 11.5–15.5)
INR PPP: 1.86
MAGNESIUM SERPL-MCNC: 2 MG/DL (ref 1.3–2.7)
MCH RBC QN AUTO: 27.2 PG (ref 27–31)
MCHC RBC AUTO-ENTMCNC: 31.7 G/DL (ref 32–36)
MCV RBC AUTO: 85.7 FL (ref 80–99)
PHOSPHATE SERPL-MCNC: 2.6 MG/DL (ref 2.4–5.1)
PLATELET # BLD AUTO: 283 10*3/MM3 (ref 150–450)
PMV BLD AUTO: 12 FL (ref 6–12)
POTASSIUM BLD-SCNC: 4.2 MMOL/L (ref 3.5–5.5)
PROTHROMBIN TIME: 20.7 SECONDS (ref 9.6–11.5)
RBC # BLD AUTO: 4.82 10*6/MM3 (ref 3.89–5.14)
SODIUM BLD-SCNC: 133 MMOL/L (ref 132–146)
WBC NRBC COR # BLD: 7.92 10*3/MM3 (ref 3.5–10.8)

## 2018-02-02 PROCEDURE — 97116 GAIT TRAINING THERAPY: CPT | Performed by: PHYSICAL THERAPIST

## 2018-02-02 PROCEDURE — 83735 ASSAY OF MAGNESIUM: CPT | Performed by: INTERNAL MEDICINE

## 2018-02-02 PROCEDURE — 25010000002 ENOXAPARIN PER 10 MG

## 2018-02-02 PROCEDURE — 94799 UNLISTED PULMONARY SVC/PX: CPT

## 2018-02-02 PROCEDURE — 99232 SBSQ HOSP IP/OBS MODERATE 35: CPT | Performed by: HOSPITALIST

## 2018-02-02 PROCEDURE — 97110 THERAPEUTIC EXERCISES: CPT | Performed by: PHYSICAL THERAPIST

## 2018-02-02 PROCEDURE — 85610 PROTHROMBIN TIME: CPT

## 2018-02-02 PROCEDURE — 94640 AIRWAY INHALATION TREATMENT: CPT

## 2018-02-02 PROCEDURE — 84100 ASSAY OF PHOSPHORUS: CPT | Performed by: INTERNAL MEDICINE

## 2018-02-02 PROCEDURE — 85027 COMPLETE CBC AUTOMATED: CPT | Performed by: INTERNAL MEDICINE

## 2018-02-02 PROCEDURE — 80048 BASIC METABOLIC PNL TOTAL CA: CPT | Performed by: INTERNAL MEDICINE

## 2018-02-02 RX ORDER — ATORVASTATIN CALCIUM 10 MG/1
10 TABLET, FILM COATED ORAL NIGHTLY
Status: DISCONTINUED | OUTPATIENT
Start: 2018-02-02 | End: 2018-02-05 | Stop reason: HOSPADM

## 2018-02-02 RX ADMIN — METOPROLOL TARTRATE 12.5 MG: 25 TABLET, FILM COATED ORAL at 08:41

## 2018-02-02 RX ADMIN — AMIODARONE HYDROCHLORIDE 200 MG: 200 TABLET ORAL at 15:41

## 2018-02-02 RX ADMIN — Medication 2 TABLET: at 21:00

## 2018-02-02 RX ADMIN — FAMOTIDINE 20 MG: 20 TABLET, FILM COATED ORAL at 08:42

## 2018-02-02 RX ADMIN — NYSTATIN: 100000 POWDER TOPICAL at 08:41

## 2018-02-02 RX ADMIN — OXYMETAZOLINE HYDROCHLORIDE 2 SPRAY: 5 SPRAY NASAL at 08:40

## 2018-02-02 RX ADMIN — FLUTICASONE PROPIONATE 2 SPRAY: 50 SPRAY, METERED NASAL at 08:40

## 2018-02-02 RX ADMIN — AMIODARONE HYDROCHLORIDE 200 MG: 200 TABLET ORAL at 21:09

## 2018-02-02 RX ADMIN — AMIODARONE HYDROCHLORIDE 200 MG: 200 TABLET ORAL at 05:17

## 2018-02-02 RX ADMIN — OXYMETAZOLINE HYDROCHLORIDE 2 SPRAY: 5 SPRAY NASAL at 22:05

## 2018-02-02 RX ADMIN — ENOXAPARIN SODIUM 90 MG: 100 INJECTION SUBCUTANEOUS at 21:00

## 2018-02-02 RX ADMIN — POTASSIUM CHLORIDE 20 MEQ: 750 CAPSULE, EXTENDED RELEASE ORAL at 08:42

## 2018-02-02 RX ADMIN — ENOXAPARIN SODIUM 90 MG: 100 INJECTION SUBCUTANEOUS at 08:41

## 2018-02-02 RX ADMIN — AMOXICILLIN AND CLAVULANATE POTASSIUM 1 TABLET: 875; 125 TABLET, FILM COATED ORAL at 21:01

## 2018-02-02 RX ADMIN — WARFARIN SODIUM 7 MG: 5 TABLET ORAL at 17:19

## 2018-02-02 RX ADMIN — ATORVASTATIN CALCIUM 10 MG: 10 TABLET, FILM COATED ORAL at 21:00

## 2018-02-02 RX ADMIN — IPRATROPIUM BROMIDE AND ALBUTEROL SULFATE 3 ML: .5; 3 SOLUTION RESPIRATORY (INHALATION) at 07:30

## 2018-02-02 RX ADMIN — NYSTATIN: 100000 POWDER TOPICAL at 21:07

## 2018-02-02 RX ADMIN — IPRATROPIUM BROMIDE AND ALBUTEROL SULFATE 3 ML: .5; 3 SOLUTION RESPIRATORY (INHALATION) at 19:16

## 2018-02-02 RX ADMIN — IPRATROPIUM BROMIDE AND ALBUTEROL SULFATE 3 ML: .5; 3 SOLUTION RESPIRATORY (INHALATION) at 15:53

## 2018-02-02 RX ADMIN — POTASSIUM CHLORIDE 20 MEQ: 750 CAPSULE, EXTENDED RELEASE ORAL at 20:59

## 2018-02-02 RX ADMIN — AMOXICILLIN AND CLAVULANATE POTASSIUM 1 TABLET: 875; 125 TABLET, FILM COATED ORAL at 08:42

## 2018-02-02 NOTE — PROGRESS NOTES
[facility] Medicine Services  PROGRESS NOTE    Patient Name: Lavern Youngblood  : 1965  MRN: [id number]    Date of Admission: 2018  Length of Stay: 3  Primary Care Physician: No Known Provider    Subjective   Subjective     CC:  F/u CHF/Afib with RVR    HPI:  No significant acute events O/N. Sitting up in chair, HR persistently elevated in the 130s. Denies any chest pain, SOa, or palpitations. Afebrile.    Review of Systems  Otherwise ROS is negative except as mentioned in the HPI.    Objective   Objective     Vital Signs:   Temp:  [97.5 °F (36.4 °C)-98.1 °F (36.7 °C)] 97.5 °F (36.4 °C)  Heart Rate:  [] 138  Resp:  [16-20] 18  BP: ()/(58-92) 91/58        Physical Exam:  General Assessment: No acute cardiopulmonary distress.     HEENT: NCAT, PERRL, MM moist    Neck: Supple    CVS: Irreg, tachycardic, S1S2 normal, no murmurs    Resp: Good air flow, + crackles bilat bases, no wheezing, resp non-labored    Abd: soft, NT, ND, normal BS, no guarding or peritoneal signs    Ext: No edema, both calves are symmetric and NTTP    Neuro: Nonfocal    Skin: W/D/I. No rash.    Psych: Affect is appropriate        Results Reviewed:  I have personally reviewed current lab, radiology, and data and agree.      Results from last 7 days  Lab Units 18  0618   WBC 10*3/mm3 7.92 19.14* 28.32*   HEMOGLOBIN g/dL 13.1 13.5 13.1   HEMATOCRIT % 41.3 41.7 40.1   PLATELETS 10*3/mm3 283 267 290   INR  1.86 1.75 1.42       Results from last 7 days  Lab Units 18  [id number] 18  0748   SODIUM mmol/L 133 135 135  --  134   POTASSIUM mmol/L 4.2 4.0 4.1  --  3.8   CHLORIDE mmol/L 102 106 106  --  102   CO2 mmol/L 21.0 24.0 22.0  --  21.0   BUN mg/dL 14 18 17  --  17   CREATININE mg/dL 0.70 0.60 0.60  --  0.80   GLUCOSE mg/dL 80 98 124*  --  151*   CALCIUM mg/dL 9.6 10.4 10.2  --  10.7*   ALT (SGPT) U/L  --   --   --   --   20   AST (SGOT) U/L  --   --   --   --  32   TROPONIN I ng/mL  --   --  1.480* 0.054* 0.043*     Estimated Creatinine Clearance: 104.3 mL/min (by C-G formula based on Cr of 0.7).  No results found for: BNP  pH, Arterial   Date Value Ref Range Status   01/30/2018 7.478 (H) 7.350 - 7.450 pH units Final       Microbiology Results Abnormal     Procedure Component Value - Date/Time    Blood Culture - Blood, [[id number]]  (Normal) Collected:  01/30/18 0734    Lab Status:  Preliminary result Specimen:  Blood from Arm, Left Updated:  02/02/18 0816     Blood Culture No growth at 3 days    Blood Culture - Blood, [[id number]]  (Normal) Collected:  01/30/18 0748    Lab Status:  Preliminary result Specimen:  Blood from Hand, Right Updated:  02/02/18 0816     Blood Culture No growth at 3 days    Urine Culture - Urine, Urine, Clean Catch [[id number]]  (Normal) Collected:  01/30/18 1452    Lab Status:  Final result Specimen:  Urine from Urine, Clean Catch Updated:  02/01/18 0841     Urine Culture No growth at 2 days          Imaging Results (last 24 hours)     ** No results found for the last 24 hours. **        Results for orders placed during the hospital encounter of 01/30/18   Adult Transthoracic Echo Complete W/ Cont if Necessary Per Protocol    Narrative · Left ventricular systolic function is normal.  · Mild tricuspid valve regurgitation is present.          I have reviewed the medications.    Assessment/Plan   Assessment / Plan     Hospital Problem List     * (Principal)A-fib with RVR    VHD (valvular heart disease) s/p AVR, MVR 2011    Acute on chronic diastolic congestive heart failure    Former smoker, quit 2010    Anticoagulated on Coumadin    H/O: CVA with residual memory loss, mild right sided weakness, 2010    Subtherapeutic international normalized ratio (INR)    Improved RUL Infiltrate (R/O CAP, atelectasis)             Brief Hospital Course to date:  Lavern Youngblood is a 52 y.o. female with a known history of PAF,  rheumatic VHD status post previous mechanical aortic and mitral valve replacements now chronically anticoagulated with Coumadin.  She presented to OSH ED with 2-3 days of dry cough, congestion and increasing HERNANDEZ.  Patient also noted associated increased BLE edema despite taking her home Lasix dose.  At OSH ED found to have RUL pneumonia and was in Afib with RVR.  Her BP's were low but improved with IVF's and correction of RVR to promote better effective stroke volumes.  Transferred to [facility] for evaluation and treatment as Dr. Wilson is her primary Cardiologist. On the night after admission, pt became hemodynamically unstable, HR 150s, SBP 60s, and RR 40s. She was transferred to the ICU on 1/30 and returned to regular floor on 2/1 and IM assuming care on 2/2.      Assessment & Plan:  - Management of CHF/Afib with RVR per cardiology, HR not well controlled, still in the 130s this morning.  - Cont on Augmentin for sinusitis, not sure if pt actually has pneumonia. CXR on 1/30 showed RUL opacity, but resolved on repeat CXR only 13 hrs later. Augmentin should cover for CAP also. Leukocytosis improving. Pt is afebrile/nontoxic  - INR subtherapeutic, pharm to manage, goal 2.5-3.5 for mechanical valve.  - PT/ CM to eval for Dc planning      DVT Prophylaxis:  Coumadin    CODE STATUS: Full Code    Disposition: TBD    Manuel Angulo MD  02/02/18  2:41 PM

## 2018-02-02 NOTE — PROGRESS NOTES
"Pharmacy Consult  -  Warfarin    Lavern Youngblood is a  52 y.o. female   Height - 162.6 cm (64\")  Weight - 88 kg89.6 kg (197 lb 9.6 oz)    Consulting Provider: - Britt Cantu MD  Indication: - Mechanical Mitral & Aortic Valve, AFib  Goal INR: - 2.5-3.5  Home Regimen:   - Warfarin 7 mg daily     Bridge Therapy: Yes   and enoxapain 90 mg SQ q12h     Drug-Drug Interactions with current regimen:   Amiodarone - increase INR - major drug drug interaction (started 1/30/18)              Augmentin - increase risk of bleeding              Enoxaparin - increase risk of bleeding    Warfarin Dosing During Admission:    Date  1/30 1/31 2/1 2/2        INR  1.28 1.42 1.75 1.86        Dose  6 mg 6 mg 7mg 7mg             Education Provided:  Follow-up during admission    Labs:  Results from last 7 days   Lab Units 02/02/18  0606 02/01/18 0452 01/31/18 0522 01/30/18 2009 01/30/18  0748   INR  1.86 1.75 1.42 --  1.28   HEMOGLOBIN g/dL --  13.5 13.1 13.7 15.0   HEMATOCRIT % --  41.7 40.1 42.1 44.4*   PLATELETS 10*3/mm3 --  267 290 301 329   Results from last 7 days   Lab Units 02/01/18 0452 01/31/18 0522 01/30/18  0748   SODIUM mmol/L 135 135 134   POTASSIUM mmol/L 4.0 4.1 3.8   CHLORIDE mmol/L 106 106 102   CO2 mmol/L 24.0 22.0 21.0   BUN mg/dL 18 17 17   CREATININE mg/dL 0.60 0.60 0.80   CALCIUM mg/dL 10.4 10.2 10.7*   BILIRUBIN mg/dL --  --  1.0   ALK PHOS U/L --  --  75   ALT (SGPT) U/L --  --  20   AST (SGOT) U/L --  --  32   GLUCOSE mg/dL 98 124* 151*     Current dietary intake: 75% intake documented    Diet Order   Procedures   • Diet Regular; Cardiac     Assessment/Plan:     2/2 INR -1.86, increased from 1.75,   Goal INR 2.5-3.5.   bridge - enoxaparin 1 mg/kg SQ q12h.    -Amiodarone  started 1/30/18. Anticipated onset of drug-drug interaction between amiodarone and warfarin is 3-7 days from initiation of therapy (expect 50% dose reduction compared to admission regimen)  -Will continue warfarin 7mg daily  -Pharmacy will continue " to monitor and adjust warfarin dose as necessary based on labs, drug interactions, diet, and clinical status.     Thank you,  Rajesh Kahn [facility]  2/2/2018  7:28 AM

## 2018-02-02 NOTE — PLAN OF CARE
Problem: Patient Care Overview (Adult)  Goal: Plan of Care Review  Outcome: Ongoing (interventions implemented as appropriate)   02/02/18 6851   Coping/Psychosocial Response Interventions   Plan Of Care Reviewed With patient   Patient Care Overview   Progress progress toward functional goals as expected   Outcome Evaluation   Outcome Summary/Follow up Plan Patient has no complaints, afib staying between 110's-130's. OK to shower per Dr. Angulo.      Goal: Adult Individualization and Mutuality  Outcome: Ongoing (interventions implemented as appropriate)    Goal: Discharge Needs Assessment  Outcome: Ongoing (interventions implemented as appropriate)      Problem: Sepsis (Adult)  Goal: Signs and Symptoms of Listed Potential Problems Will be Absent or Manageable (Sepsis)  Outcome: Ongoing (interventions implemented as appropriate)      Problem: Arrhythmia/Dysrhythmia (Symptomatic) (Adult)  Goal: Signs and Symptoms of Listed Potential Problems Will be Absent or Manageable (Arrhythmia/Dysrhythmia)  Outcome: Ongoing (interventions implemented as appropriate)      Problem: Pressure Ulcer Risk (Tomas Scale) (Adult,Obstetrics,Pediatric)  Goal: Identify Related Risk Factors and Signs and Symptoms  Outcome: Ongoing (interventions implemented as appropriate)    Goal: Skin Integrity  Outcome: Ongoing (interventions implemented as appropriate)

## 2018-02-02 NOTE — PROGRESS NOTES
[facility]       LOS: 3 days   Patient Care Team:  No Known Provider as PCP - General        Subjective       Patient Denies:  Cp, sob, palpitations.      Vital Signs  Temp:  [97.7 °F (36.5 °C)-98.1 °F (36.7 °C)] 97.7 °F (36.5 °C)  Heart Rate:  [] 101  Resp:  [16-24] 16  BP: (104-140)/() 113/79    Intake/Output Summary (Last 24 hours) at 02/02/18 0946  Last data filed at 02/02/18 0900   Gross per 24 hour   Intake              350 ml   Output              750 ml   Net             -400 ml     I/O this shift:  In: 240 [P.O.:240]  Out: -     Physical Exam:     General Appearance:    Alert, cooperative, in no acute distress       Neck:   No adenopathy, supple, trachea midline, no thyromegaly, no JVD       Lungs:     Clear to auscultation,respirations regular, even and                  unlabored    Heart:    Irregular rhythm and normal rate, normal S1 and S2, no            murmur, no gallop, no rub, no click   Chest Wall:    No abnormalities observed   Abdomen:     Normal bowel sounds, no masses, no organomegaly, soft        non-tender, non-distended       Extremities:   Moves all extremities well, no edema, no cyanosis, no             redness   Pulses:   Pulses palpable and equal bilaterally     Results Review:     I reviewed the patient's new clinical results.      WBC WBC   Date/Time Value Ref Range Status   02/02/2018 0606 7.92 3.50 - 10.80 10*3/mm3 Final   02/01/2018 0452 19.14 (H) 3.50 - 10.80 10*3/mm3 Final   01/31/2018 0522 28.32 (H) 3.50 - 10.80 10*3/mm3 Final   01/30/2018 2009 26.54 (H) 3.50 - 10.80 10*3/mm3 Final            HGB Hemoglobin   Date/Time Value Ref Range Status   02/02/2018 0606 13.1 11.5 - 15.5 g/dL Final   02/01/2018 0452 13.5 11.5 - 15.5 g/dL Final   01/31/2018 0522 13.1 11.5 - 15.5 g/dL Final   01/30/2018 2009 13.7 11.5 - 15.5 g/dL Final           HCT Hematocrit   Date/Time Value Ref Range Status   02/02/2018 0606 41.3  34.5 - 44.0 % Final   02/01/2018 0452 41.7 34.5 - 44.0 % Final   01/31/2018 0522 40.1 34.5 - 44.0 % Final   01/30/2018 2009 42.1 34.5 - 44.0 % Final            Platlets No results found for: LABPLAT  Sodium  Sodium   Date/Time Value Ref Range Status   02/02/2018 0606 133 132 - 146 mmol/L Final   02/01/2018 0452 135 132 - 146 mmol/L Final   01/31/2018 0522 135 132 - 146 mmol/L Final     Potassium  Potassium   Date/Time Value Ref Range Status   02/02/2018 0606 4.2 3.5 - 5.5 mmol/L Final   02/01/2018 0452 4.0 3.5 - 5.5 mmol/L Final   01/31/2018 0522 4.1 3.5 - 5.5 mmol/L Final     Chloride  Chloride   Date/Time Value Ref Range Status   02/02/2018 0606 102 99 - 109 mmol/L Final   02/01/2018 0452 106 99 - 109 mmol/L Final   01/31/2018 0522 106 99 - 109 mmol/L Final     BicarbonateNo results found for: PLASMABICARB    BUN BUN   Date/Time Value Ref Range Status   02/02/2018 0606 14 9 - 23 mg/dL Final   02/01/2018 0452 18 9 - 23 mg/dL Final   01/31/2018 0522 17 9 - 23 mg/dL Final      Creatinine Creatinine   Date/Time Value Ref Range Status   02/02/2018 0606 0.70 0.60 - 1.30 mg/dL Final   02/01/2018 0452 0.60 0.60 - 1.30 mg/dL Final   01/31/2018 0522 0.60 0.60 - 1.30 mg/dL Final      Calcium Calcium   Date/Time Value Ref Range Status   02/02/2018 0606 9.6 8.7 - 10.4 mg/dL Final   02/01/2018 0452 10.4 8.7 - 10.4 mg/dL Final   01/31/2018 0522 10.2 8.7 - 10.4 mg/dL Final      Mag Magnesium   Date/Time Value Ref Range Status   02/02/2018 0606 2.0 1.3 - 2.7 mg/dL Final   02/01/2018 0452 2.2 1.3 - 2.7 mg/dL Final   01/31/2018 0522 2.2 1.3 - 2.7 mg/dL Final   01/30/2018 2009 2.1 1.3 - 2.7 mg/dL Final           PT/INR:    Protime   Date Value Ref Range Status   02/02/2018 20.7 (H) 9.6 - 11.5 Seconds Final   02/01/2018 19.4 (H) 9.6 - 11.5 Seconds Final   01/31/2018 15.6 (H) 9.6 - 11.5 Seconds Final   /  INR   Date Value Ref Range Status   02/02/2018 1.86  Final   02/01/2018 1.75  Final   01/31/2018 1.42  Final     Troponin I     Lab  Results   Component Value Date    TROPONINI 1.480 (C) 01/31/2018         amiodarone 200 mg Oral Q8H   Followed by      [START ON 2/7/2018] amiodarone 200 mg Oral Q12H   Followed by      [START ON 2/22/2018] amiodarone 200 mg Oral Daily   amoxicillin-clavulanate 1 tablet Oral Q12H   atorvastatin 10 mg Oral Nightly   enoxaparin 90 mg Subcutaneous Q12H   famotidine 20 mg Oral Daily   fluticasone 2 spray Each Nare Daily   ipratropium-albuterol 3 mL Nebulization Q4H - RT   metoprolol tartrate 12.5 mg Oral Q12H   nystatin  Topical Q12H   oxymetazoline 2 spray Each Nare BID   Pharmacy to Dose enoxaparin (LOVENOX)  Does not apply Daily   Pharmacy to dose warfarin  Does not apply Daily   potassium chloride 20 mEq Oral BID   sennosides-docusate sodium 2 tablet Oral Nightly   warfarin 7 mg Oral Daily          Assessment/Plan     Patient Active Problem List   Diagnosis Code   • A-fib with RVR I48.91   • VHD (valvular heart disease) s/p AVR, MVR 2011 I38   • Acute on chronic diastolic congestive heart failure I50.33   • Former smoker, quit 2010 Z87.891   • Anticoagulated on Coumadin Z51.81, Z79.01   • 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

## 2018-02-02 NOTE — THERAPY TREATMENT NOTE
Acute Care - Physical Therapy Treatment Note  [facility]     Patient Name: Lavern Youngblood  : 1965  MRN: [id number]  Today's Date: 2018  Onset of Illness/Injury or Date of Surgery Date: 18  Date of Referral to PT: 18  Referring Physician: ALLA Cook    Admit Date: 2018    Visit Dx:    ICD-10-CM ICD-9-CM   1. 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   • H/O: CVA with residual memory loss, mild right sided weakness,    • Subtherapeutic international normalized ratio (INR)   • Improved RUL Infiltrate (R/O CAP, atelectasis)               Adult Rehabilitation Note       18 1526 18 1600 18 1539    Rehab Assessment/Intervention    Discipline physical therapist  -LM --  -KR physical therapist  -KR    Document Type therapy note (daily note)  -LM --  -KR therapy note (daily note)  -KR    Subjective Information agree to therapy;no complaints  -LM --  -KR agree to therapy;no complaints  -KR    Patient Effort, Rehab Treatment excellent  -LM --  -KR excellent  -KR    Symptoms Noted During/After Treatment none  -LM --  -KR significant change in vital signs  -KR    Symptoms Noted Comment  --  -KR Elevated HR with increased activity  -KR    Precautions/Limitations fall precautions  -LM --  -KR fall precautions;cardiac precautions  -KR    Recorded by [LM] Luna Dos Santos, PT [KR] Kiarra Holcomb, PT [KR] Kiarra Holcomb, PT    Vital Signs    Pre Systolic BP Rehab 91  -LM --  -  -KR    Pre Treatment Diastolic BP 58  -LM --  -KR 90  -KR    Post Systolic BP Rehab 99  -LM --  -  -KR    Post Treatment Diastolic BP 79  -LM --  -KR 96  -KR    Pretreatment Heart Rate  (beats/min)  --  -KR 87  -KR    Intratreatment Heart Rate (beats/min) 138   s/p 800 feet of amb; Returned to normal with rest  -LM --  -  -KR    Posttreatment Heart Rate (beats/min) 106  -LM --  -  -KR    Pre SpO2 (%)  --  -KR 96  -KR    O2 Delivery Pre Treatment  --  -KR room air  -KR    Intra SpO2 (%)  --  -KR 95  -KR    O2 Delivery Intra Treatment  --  -KR room air  -KR    Post SpO2 (%)  --  -KR 97  -KR    O2 Delivery Post Treatment  --  -KR room air  -KR    Pre Patient Position Sitting  -LM --  -KR Sitting  -KR    Intra Patient Position Standing  -LM --  -KR Standing  -KR    Post Patient Position Sitting  -LM --  -KR Sitting  -KR    Recorded by [LM] Luna Dos Santos, PT [KR] Kiarra Holcomb, PT [KR] Kiarra Holcomb, PT    Pain Assessment    Pain Assessment 0-10  -LM --  -KR 0-10  -KR    Pain Score 0  -LM --  -KR 0  -KR    Post Pain Score 0  -LM --  -KR 0  -KR    Recorded by [LM] Luna Dos Santos, PT [KR] Kiarra Holcomb, PT [KR] Kiarra Holcomb, PT    Cognitive Assessment/Intervention    Current Cognitive/Communication Assessment functional  -LM --  -KR functional  -KR    Orientation Status oriented x 4  -LM --  -KR oriented x 4  -KR    Follows Commands/Answers Questions 100% of the time;able to follow single-step instructions  -LM --  -KR able to follow single-step instructions;100% of the time  -KR    Personal Safety WNL/WFL  -LM --  -KR WNL/WFL  -KR    Personal Safety Interventions fall prevention program maintained;gait belt;muscle strengthening facilitated;nonskid shoes/slippers when out of bed  -LM --  -KR fall prevention program maintained;gait belt;nonskid shoes/slippers when out of bed  -KR    Recorded by [LM] Luna Dos Santos, PT [KR] Kiarra Holcomb, PT [KR] Kiarra Holcomb, PT    Bed Mobility, Assessment/Treatment    Bed Mob, Supine to Sit, Hall   not tested  -KR    Bed Mob, Sit to Supine, Hall   not tested  -KR    Bed Mobility, Comment   UIC  -KR    Recorded by   [KR] Kiarra Holcomb, PT     Transfer Assessment/Treatment    Transfers, Sit-Stand [place] stand by assist  -LM  contact guard assist;verbal cues required  -KR    Transfers, Stand-Sit [place] stand by assist  -LM  contact guard assist;verbal cues required  -KR    Transfers, Sit-Stand-Sit, Assist Device rolling walker  -LM  rolling walker  -KR    Transfer, Safety Issues   step length decreased  -KR    Transfer, Comment   Pt given VC's for correct hand placement.   -KR    Recorded by [LM] Luna Dos Santos PT  [KR] Kiarra Holcomb PT    Gait Assessment/Treatment    Gait, [place] Level stand by assist  -LM  contact guard assist;1 person + 1 person to manage equipment;verbal cues required  -KR    Gait, Assistive Device rolling walker  -LM  rolling walker  -KR    Gait, Distance (Feet) 800  -LM  300  -KR    Gait, Gait Pattern Analysis swing-through gait  -LM  swing-through gait  -KR    Gait, Gait Deviations forward flexed posture  -LM  anna decreased;forward flexed posture;step length decreased  -KR    Gait, Safety Issues weight-shifting ability decreased  -LM  step length decreased  -KR    Gait, Impairments strength decreased  -LM  strength decreased  -KR    Gait, Comment VC's for upright posture and to stay inside the walker  -LM  Pt demonstrated slow gait speed with decreased step length, but had no periods of decreased balance. O2 sats remained in mid 90s; elevated HR noted with increased mobility.   -KR    Recorded by [LM] Luna Dos Santos PT  [KR] Kiarra Holcomb, PT    Stairs Assessment/Treatment    Stairs, [place] Level not tested  -LM      Recorded by [LM] Luna Dos Santos PT      Motor Skills/Interventions    Additional Documentation Balance Skills Training (Group)  -LM  Balance Skills Training (Group)  -KR    Recorded by [LM] Luna Dos Santos PT  [KR] Kiarra Holcomb, PT    Balance Skills Training    Sitting-Level of Assistance Independent  -LM  Distant supervision  -KR    Sitting-Balance Support Feet supported  -LM  Feet supported   -KR    Standing-Level of Assistance Close supervision  -LM  Contact guard  -KR    Static Standing Balance Support assistive device  -LM  assistive device  -KR    Gait Balance-Level of Assistance Close supervision  -LM  Contact guard  -KR    Gait Balance Support assistive device  -LM  assistive device  -KR    Recorded by [LM] Luna Dos Santos, PT  [KR] Kiarra Holcomb PT    Therapy Exercises    Bilateral Lower Extremities AROM:;20 reps;sitting;hip flexion;LAQ;hip abduction/adduction   x10 sit to stands using UE support  -LM  AROM:;10 reps;sitting;ankle pumps/circles;hip flexion;LAQ  -KR    Recorded by [LM] Luna Dos Santos, PT  [KR] Kiarra Holcomb PT    Positioning and Restraints    Pre-Treatment Position sitting in chair/recliner  -LM  sitting in chair/recliner  -KR    Post Treatment Position chair  -LM  chair  -KR    In Chair sitting;call light within reach;encouraged to call for assist;with family/caregiver;notified nsg  -LM  notified nsg;reclined;call light within reach;encouraged to call for assist;waffle cushion;legs elevated  -KR    Recorded by [LM] Luna Dos Santos, PT  [KR] Kiarra Holcomb PT      User Key  (r) = Recorded By, (t) = Taken By, (c) = Cosigned By    Initials Name Effective Dates     Luna Dos Santos, PT 06/15/16 -     KR Kiarra Holcomb, PT 09/25/17 -                 IP PT Goals       02/02/18 1526 02/01/18 1623 01/31/18 1349    Bed Mobility PT LTG    Bed Mobility PT LTG, Date Established   01/31/18  -LS    Bed Mobility PT LTG, Time to Achieve   2 wks  -LS    Bed Mobility PT LTG, Activity Type   supine to sit/sit to supine  -LS    Bed Mobility PT LTG, [place] Level   independent  -LS    Bed Mobility PT LTG, Outcome goal ongoing  -LM goal ongoing  -KR     Transfer Training PT LTG    Transfer Training PT LTG, Date Established   01/31/18  -LS    Transfer Training PT LTG, Time to Achieve   2 wks  -LS    Transfer Training PT LTG, Activity Type   sit to stand/stand to sit  -LS    Transfer Training PT LTG,  [place] Level   conditional independence  -LS    Transfer Training PT LTG, Outcome goal ongoing  -LM goal ongoing  -KR     Gait Training PT LTG    Gait Training Goal PT LTG, Date Established   01/31/18  -LS    Gait Training Goal PT LTG, Time to Achieve   2 wks  -LS    Gait Training Goal PT LTG, [place] Level   conditional independence  -LS    Gait Training Goal PT LTG, Assist Device   walker, rolling  -LS    Gait Training Goal PT LTG, Distance to Achieve   400  -LS    Gait Training Goal PT LTG, Outcome goal ongoing  -LM goal ongoing  -KR     Stair Training PT LTG    Stair Training Goal PT LTG, Date Established   01/31/18  -LS    Stair Training Goal PT LTG, Time to Achieve   2 wks  -LS    Stair Training Goal PT LTG, Number of Steps   2  -LS    Stair Training Goal PT LTG, [place] Level   supervision required  -LS    Stair Training Goal PT LTG, Assist Device   1 handrail  -LS    Stair Training Goal PT LTG, Outcome goal ongoing  -LM goal ongoing  -KR       User Key  (r) = Recorded By, (t) = Taken By, (c) = Cosigned By    Initials Name Provider Type    LS Joycelyn Atkinson, PT Physical Therapist    SHILPA Dos Santos, PT Physical Therapist    UZIEL Holcomb, PT Physical Therapist          Physical Therapy Education     Title: PT OT SLP Therapies (Active)     Topic: Physical Therapy (Active)     Point: Mobility training (Done)    Learning Progress Summary    Learner Readiness Method Response Comment Documented by Status   Patient Acceptance E VU,NR  LM 02/02/18 1607 Done    Acceptance E NR  KR 02/01/18 1623 Active    Acceptance E,D NR  LS 01/31/18 1349 Active               Point: Home exercise program (Active)    Learning Progress Summary    Learner Readiness Method Response Comment Documented by Status   Patient Acceptance E NR  KR 02/01/18 1623 Active    Acceptance E,D NR  LS 01/31/18 1349 Active               Point: Body mechanics (Active)    Learning Progress Summary    Learner Readiness Method Response  Comment Documented by Status   Patient Acceptance E NR  KR 02/01/18 1623 Active    Acceptance E,D NR  LS 01/31/18 1349 Active               Point: Precautions (Done)    Learning Progress Summary    Learner Readiness Method Response Comment Documented by Status   Patient Acceptance MAL MURRAY  LM 02/02/18 1607 Done    Acceptance E NR  KR 02/01/18 1623 Active    Acceptance E,D NR  LS 01/31/18 1349 Active                      User Key     Initials Effective Dates Name Provider Type Discipline     06/19/15 -  Joycelyn Atkinson, PT Physical Therapist PT     06/15/16 -  Luna Dos Santos, PT Physical Therapist PT     09/25/17 -  Kiarra Holcomb, PT Physical Therapist PT                    PT Recommendation and Plan  Anticipated Equipment Needs At Discharge: front wheeled walker  Anticipated Discharge Disposition: home with assist  PT Frequency: daily  Plan of Care Review  Plan Of Care Reviewed With: patient  Outcome Summary/Follow up Plan: Pt progressing towards skilled PT goals.  Pt stood with SBA and ambulated 800 feet with SBA using rw - highest HR increased to is 138 (recovered with rest).  Pt will continue to benefit from skilled PT to improve mobility and safety prior to d/c.          Outcome Measures       02/02/18 1526 02/01/18 1539 01/31/18 1102    How much help from another person do you currently need...    Turning from your back to your side while in flat bed without using bedrails? 4  -LM 3  -KR 3  -LS    Moving from lying on back to sitting on the side of a flat bed without bedrails? 4  -LM 3  -KR 3  -LS    Moving to and from a bed to a chair (including a wheelchair)? 3  -LM 3  -KR 3  -LS    Standing up from a chair using your arms (e.g., wheelchair, bedside chair)? 3  -LM 3  -KR 3  -LS    Climbing 3-5 steps with a railing? 3  -LM 3  -KR 3  -LS    To walk in hospital room? 3  -LM 3  -KR 3  -LS    AM-PAC 6 Clicks Score 20  -LM 18  -KR 18  -LS    Functional Assessment    Outcome Measure Options AM-PAC 6 Clicks Basic  Mobility (PT)  -LM AM-PAC 6 Clicks Basic Mobility (PT)  -KR AM-PAC 6 Clicks Basic Mobility (PT)  -LS      User Key  (r) = Recorded By, (t) = Taken By, (c) = Cosigned By    Initials Name Provider Type    LS Joycelyn Atkinson, PT Physical Therapist    LM Luna Thompson, PT Physical Therapist    UZIEL Holcomb, PT Physical Therapist           Time Calculation:         PT Charges       02/02/18 1526          Time Calculation    Start Time 1526  -LM      PT Received On 02/02/18  -LM      PT Goal Re-Cert Due Date 02/10/18  -LM      Time Calculation- PT    Total Timed Code Minutes- PT 24 minute(s)  -LM        User Key  (r) = Recorded By, (t) = Taken By, (c) = Cosigned By    Initials Name Provider Type    SHILPA [place], PT Physical Therapist          Therapy Charges for Today     Code Description Service Date Service Provider Modifiers Qty    78817953196 HC GAIT TRAINING EA 15 MIN 2/2/2018 Luna Thompson, PT GP 1    80480004696 HC PT THER PROC EA 15 MIN 2/2/2018 Luna Thompson, PT GP 1          PT G-Codes  Outcome Measure Options: AM-PAC 6 Clicks Basic Mobility (PT)    Luna Thompson PT  2/2/2018

## 2018-02-02 NOTE — PLAN OF CARE
Problem: Patient Care Overview (Adult)  Goal: Plan of Care Review  Outcome: Ongoing (interventions implemented as appropriate)   02/02/18 0551   Coping/Psychosocial Response Interventions   Plan Of Care Reviewed With patient;family   Patient Care Overview   Progress progress toward functional goals is gradual   Outcome Evaluation   Outcome Summary/Follow up Plan VSSEstiven Pt remains in Afib with HR around 110. Her menstural flow is very heavy that saturated the pads and dry flow pads several times but she says this is normal for her.

## 2018-02-02 NOTE — PLAN OF CARE
Problem: Patient Care Overview (Adult)  Goal: Plan of Care Review  Outcome: Ongoing (interventions implemented as appropriate)   02/02/18 1526   Coping/Psychosocial Response Interventions   Plan Of Care Reviewed With patient   Outcome Evaluation   Outcome Summary/Follow up Plan Pt progressing towards skilled PT goals. Pt stood with SBA and ambulated 800 feet with SBA using rw - highest HR increased to is 138 (recovered with rest). Pt will continue to benefit from skilled PT to improve mobility and safety prior to d/c.       Problem: Inpatient Physical Therapy  Goal: Bed Mobility Goal LTG- PT  Outcome: Ongoing (interventions implemented as appropriate)   01/31/18 1349 02/02/18 1526   Bed Mobility PT LTG   Bed Mobility PT LTG, Date Established 01/31/18 --    Bed Mobility PT LTG, Time to Achieve 2 wks --    Bed Mobility PT LTG, Activity Type supine to sit/sit to supine --    Bed Mobility PT LTG, [place] Level independent --    Bed Mobility PT LTG, Outcome --  goal ongoing     Goal: Transfer Training Goal 1 LTG- PT  Outcome: Ongoing (interventions implemented as appropriate)   01/31/18 1349 02/02/18 1526   Transfer Training PT LTG   Transfer Training PT LTG, Date Established 01/31/18 --    Transfer Training PT LTG, Time to Achieve 2 wks --    Transfer Training PT LTG, Activity Type sit to stand/stand to sit --    Transfer Training PT LTG, [place] Level conditional independence --    Transfer Training PT LTG, Outcome --  goal ongoing     Goal: Gait Training Goal LTG- PT  Outcome: Ongoing (interventions implemented as appropriate)   01/31/18 1349 02/02/18 1526   Gait Training PT LTG   Gait Training Goal PT LTG, Date Established 01/31/18 --    Gait Training Goal PT LTG, Time to Achieve 2 wks --    Gait Training Goal PT LTG, [place] Level conditional independence --    Gait Training Goal PT LTG, Assist Device walker, rolling --    Gait Training Goal PT LTG, Distance to Achieve 400 --    Gait Training Goal PT  LTG, Outcome --  goal ongoing     Goal: Stair Training Goal LTG- PT  Outcome: Ongoing (interventions implemented as appropriate)   01/31/18 1349 02/02/18 1526   Stair Training PT LTG   Stair Training Goal PT LTG, Date Established 01/31/18 --    Stair Training Goal PT LTG, Time to Achieve 2 wks --    Stair Training Goal PT LTG, Number of Steps 2 --    Stair Training Goal PT LTG, [place] Level supervision required --    Stair Training Goal PT LTG, Assist Device 1 handrail --    Stair Training Goal PT LTG, Outcome --  goal ongoing

## 2018-02-02 NOTE — NURSING NOTE
Patient Name:  Lavern Youngblood  :  1965  DOS:  18    Hospital Problem List     * (Principal)A-fib with RVR    VHD (valvular heart disease) s/p AVR, MVR     Acute on chronic diastolic congestive heart failure    Former smoker, quit     Anticoagulated on Coumadin    H/O: CVA with residual memory loss, mild right sided weakness,     Subtherapeutic international normalized ratio (INR)    Improved RUL Infiltrate (R/O CAP, atelectasis)            Medical records have been reviewed. Patient admitted with PNA and AF with RVR.  Patient is a good candidate for the [facility].  Education provided.  Education time 10 min.  Patient to be scheduled follow up 2 weeks post discharge.    Echo Results:18  · Left ventricular systolic function is normal.  · Mild tricuspid valve regurgitation is present.      Atrial fibrillation / Atrial flutter:  Quality Measure    CHADS-VASc Score:3    Anticoagulation for CHADS-VASc >/=2    Yes      Statin Therapy (for patients with a history of CAD, CVA/TIA, PVA, DM)  Yes      Atrial fibrillation / Atrial flutter education:   Signs / symptoms and Role of [facility] and when to call

## 2018-02-03 LAB
INR PPP: 2.05
PROTHROMBIN TIME: 22.9 SECONDS (ref 9.6–11.5)

## 2018-02-03 PROCEDURE — 99232 SBSQ HOSP IP/OBS MODERATE 35: CPT | Performed by: HOSPITALIST

## 2018-02-03 PROCEDURE — 25010000002 ENOXAPARIN PER 10 MG

## 2018-02-03 PROCEDURE — 85610 PROTHROMBIN TIME: CPT

## 2018-02-03 PROCEDURE — 94799 UNLISTED PULMONARY SVC/PX: CPT

## 2018-02-03 PROCEDURE — 97110 THERAPEUTIC EXERCISES: CPT

## 2018-02-03 PROCEDURE — 94640 AIRWAY INHALATION TREATMENT: CPT

## 2018-02-03 RX ORDER — IPRATROPIUM BROMIDE AND ALBUTEROL SULFATE 2.5; .5 MG/3ML; MG/3ML
3 SOLUTION RESPIRATORY (INHALATION) EVERY 4 HOURS PRN
Status: DISCONTINUED | OUTPATIENT
Start: 2018-02-03 | End: 2018-02-05 | Stop reason: HOSPADM

## 2018-02-03 RX ADMIN — POTASSIUM CHLORIDE 20 MEQ: 750 CAPSULE, EXTENDED RELEASE ORAL at 09:04

## 2018-02-03 RX ADMIN — NYSTATIN: 100000 POWDER TOPICAL at 09:04

## 2018-02-03 RX ADMIN — POTASSIUM CHLORIDE 20 MEQ: 750 CAPSULE, EXTENDED RELEASE ORAL at 20:53

## 2018-02-03 RX ADMIN — WARFARIN SODIUM 7 MG: 5 TABLET ORAL at 18:51

## 2018-02-03 RX ADMIN — AMOXICILLIN AND CLAVULANATE POTASSIUM 1 TABLET: 875; 125 TABLET, FILM COATED ORAL at 20:52

## 2018-02-03 RX ADMIN — IPRATROPIUM BROMIDE AND ALBUTEROL SULFATE 3 ML: .5; 3 SOLUTION RESPIRATORY (INHALATION) at 08:25

## 2018-02-03 RX ADMIN — FAMOTIDINE 20 MG: 20 TABLET, FILM COATED ORAL at 09:04

## 2018-02-03 RX ADMIN — METOPROLOL TARTRATE 12.5 MG: 25 TABLET, FILM COATED ORAL at 20:53

## 2018-02-03 RX ADMIN — Medication 2 TABLET: at 20:53

## 2018-02-03 RX ADMIN — AMOXICILLIN AND CLAVULANATE POTASSIUM 1 TABLET: 875; 125 TABLET, FILM COATED ORAL at 09:04

## 2018-02-03 RX ADMIN — FLUTICASONE PROPIONATE 2 SPRAY: 50 SPRAY, METERED NASAL at 09:04

## 2018-02-03 RX ADMIN — NYSTATIN: 100000 POWDER TOPICAL at 20:55

## 2018-02-03 RX ADMIN — IPRATROPIUM BROMIDE AND ALBUTEROL SULFATE 3 ML: .5; 3 SOLUTION RESPIRATORY (INHALATION) at 03:47

## 2018-02-03 RX ADMIN — METOPROLOL TARTRATE 12.5 MG: 25 TABLET, FILM COATED ORAL at 09:04

## 2018-02-03 RX ADMIN — ATORVASTATIN CALCIUM 10 MG: 10 TABLET, FILM COATED ORAL at 20:53

## 2018-02-03 RX ADMIN — ENOXAPARIN SODIUM 90 MG: 100 INJECTION SUBCUTANEOUS at 09:04

## 2018-02-03 RX ADMIN — AMIODARONE HYDROCHLORIDE 200 MG: 200 TABLET ORAL at 15:49

## 2018-02-03 RX ADMIN — AMIODARONE HYDROCHLORIDE 200 MG: 200 TABLET ORAL at 06:37

## 2018-02-03 RX ADMIN — IPRATROPIUM BROMIDE AND ALBUTEROL SULFATE 3 ML: .5; 3 SOLUTION RESPIRATORY (INHALATION) at 00:00

## 2018-02-03 RX ADMIN — ENOXAPARIN SODIUM 90 MG: 100 INJECTION SUBCUTANEOUS at 20:54

## 2018-02-03 RX ADMIN — AMIODARONE HYDROCHLORIDE 200 MG: 200 TABLET ORAL at 21:58

## 2018-02-03 NOTE — PROGRESS NOTES
Pharmacy to dose enoxaparin     Weight 89.6 kg  Continue bridging warfarin therapy with enoxaparin 90 mg SQ q12h.  Goal INR - 2.5-3.5    Results from last 7 days   Lab Units 02/02/18  0606 02/01/18 0452 01/31/18  0522   SODIUM mmol/L 133 135 135   POTASSIUM mmol/L 4.2 4.0 4.1   CHLORIDE mmol/L 102 106 106   CO2 mmol/L 21.0 24.0 22.0   BUN mg/dL 14 18 17   CREATININE mg/dL 0.70 0.60 0.60   GLUCOSE mg/dL 80 98 124*   CALCIUM mg/dL 9.6 10.4 10.2      Results from last 7 days   Lab Units 02/02/18  0606 02/01/18 0452 01/31/18  0522   WBC 10*3/mm3 7.92 19.14* 28.32*   HEMOGLOBIN g/dL 13.1 13.5 13.1   HEMATOCRIT % 41.3 41.7 40.1   PLATELETS 10*3/mm3 283 267 290      Results from last 7 days   Lab Units 02/03/18  0636 02/02/18  0606 02/01/18 0452   INR  2.05 1.86 1.75       Assessment / Plan    1. INR of 2.05 is not yet therapeutic (goal 2.5-3.5).   2. Continue current dose of lovenox until INR is therapeutic x 2 consecutive reads (> 24 hrs). D/w RN.    Pharmacy will continue to follow.    Thank you for this consult,  Licha Villatoro, PharmD  Pharmacy Resident  2/3/2018  9:04 AM

## 2018-02-03 NOTE — PLAN OF CARE
Problem: Patient Care Overview (Adult)  Goal: Plan of Care Review  Outcome: Ongoing (interventions implemented as appropriate)   02/03/18 1637   Coping/Psychosocial Response Interventions   Plan Of Care Reviewed With patient   Outcome Evaluation   Outcome Summary/Follow up Plan Increasing independence w/transfers and ambulation. Would benefit from HEP R ankle dorsiflexor strengthening w/theraband and assessment of amb w/R AFO.       Problem: Inpatient Physical Therapy  Goal: Bed Mobility Goal LTG- PT  Outcome: Ongoing (interventions implemented as appropriate)   01/31/18 1349 02/03/18 1637   Bed Mobility PT LTG   Bed Mobility PT LTG, Date Established 01/31/18 --    Bed Mobility PT LTG, Time to Achieve 2 wks --    Bed Mobility PT LTG, Activity Type supine to sit/sit to supine --    Bed Mobility PT LTG, [place] Level independent --    Bed Mobility PT LTG, Outcome --  goal ongoing     Goal: Transfer Training Goal 1 LTG- PT  Outcome: Outcome(s) achieved Date Met: 02/03/18 01/31/18 1349 02/03/18 1637   Transfer Training PT LTG   Transfer Training PT LTG, Date Established 01/31/18 --    Transfer Training PT LTG, Time to Achieve 2 wks --    Transfer Training PT LTG, Activity Type sit to stand/stand to sit --    Transfer Training PT LTG, [place] Level conditional independence --    Transfer Training PT LTG, Outcome --  goal met     Goal: Gait Training Goal LTG- PT  Outcome: Ongoing (interventions implemented as appropriate)   01/31/18 1349 02/03/18 1637   Gait Training PT LTG   Gait Training Goal PT LTG, Date Established 01/31/18 --    Gait Training Goal PT LTG, Time to Achieve 2 wks --    Gait Training Goal PT LTG, [place] Level conditional independence --    Gait Training Goal PT LTG, Assist Device walker, rolling --    Gait Training Goal PT LTG, Distance to Achieve 400 --    Gait Training Goal PT LTG, Outcome --  goal ongoing     Goal: Stair Training Goal LTG- PT  Outcome: Ongoing (interventions  implemented as appropriate)   01/31/18 1349 02/03/18 1637   Stair Training PT LTG   Stair Training Goal PT LTG, Date Established 01/31/18 --    Stair Training Goal PT LTG, Time to Achieve 2 wks --    Stair Training Goal PT LTG, Number of Steps 2 --    Stair Training Goal PT LTG, [place] Level supervision required --    Stair Training Goal PT LTG, Assist Device 1 handrail --    Stair Training Goal PT LTG, Outcome --  goal ongoing

## 2018-02-03 NOTE — PLAN OF CARE
Problem: Patient Care Overview (Adult)  Goal: Plan of Care Review  Outcome: Ongoing (interventions implemented as appropriate)   02/03/18 6302   Coping/Psychosocial Response Interventions   Plan Of Care Reviewed With patient   Patient Care Overview   Progress progress toward functional goals as expected   Outcome Evaluation   Outcome Summary/Follow up Plan Pts HR more controlled, around low 100's, rarely reaching 120's. Ambulating well, patinet can shower.      Goal: Adult Individualization and Mutuality  Outcome: Ongoing (interventions implemented as appropriate)    Goal: Discharge Needs Assessment  Outcome: Ongoing (interventions implemented as appropriate)      Problem: Sepsis (Adult)  Goal: Signs and Symptoms of Listed Potential Problems Will be Absent or Manageable (Sepsis)  Outcome: Ongoing (interventions implemented as appropriate)      Problem: Arrhythmia/Dysrhythmia (Symptomatic) (Adult)  Goal: Signs and Symptoms of Listed Potential Problems Will be Absent or Manageable (Arrhythmia/Dysrhythmia)  Outcome: Ongoing (interventions implemented as appropriate)      Problem: Pressure Ulcer Risk (Tomas Scale) (Adult,Obstetrics,Pediatric)  Goal: Identify Related Risk Factors and Signs and Symptoms  Outcome: Ongoing (interventions implemented as appropriate)    Goal: Skin Integrity  Outcome: Ongoing (interventions implemented as appropriate)

## 2018-02-03 NOTE — PROGRESS NOTES
[facility] Medicine Services  PROGRESS NOTE    Patient Name: Lavern Youngblood  : 1965  MRN: [id number]    Date of Admission: 2018  Length of Stay: 4  Primary Care Physician: No Known Provider    Subjective   Subjective     CC:  F/u Afib with RVR and CHF    HPI:  No significant events O/N. Doing well, denies any CP, SOA, or palpitations. Sitting up in bed and eating lunch. Still tachycardic, in Afib with rate 130s on tele. No other complaints.    Review of Systems  Otherwise ROS is negative except as mentioned in the HPI.    Objective   Objective     Vital Signs:   Temp:  [97.3 °F (36.3 °C)-98.3 °F (36.8 °C)] 97.3 °F (36.3 °C)  Heart Rate:  [] 98  Resp:  [16-20] 18  BP: ()/(73-84) 100/78        Physical Exam:  General Assessment: No acute cardiopulmonary distress.      HEENT: NCAT, PERRL, MM moist     Neck: Supple     CVS: Irreg, tachycardic, S1S2 normal, no murmurs     Resp: Good air flow, + crackles bilat bases, mild scattered wheezing at left base, resp non-labored     Abd: soft, NT, ND, normal BS, no guarding or peritoneal signs     Ext: No edema, both calves are symmetric and NTTP     Neuro: Nonfocal     Skin: W/D/I. No rash.     Psych: Affect is appropriate      Results Reviewed:  I have personally reviewed current lab, radiology, and data and agree.      Results from last 7 days  Lab Units 18  0636 18   WBC 10*3/mm3  --  7.92 19.14* 28.32*   HEMOGLOBIN g/dL  --  13.1 13.5 13.1   HEMATOCRIT %  --  41.3 41.7 40.1   PLATELETS 10*3/mm3  --  283 267 290   INR  2.05 1.86 1.75 1.42       Results from last 7 days  Lab Units 18  0606 18  0748   SODIUM mmol/L 133 135 135  --  134   POTASSIUM mmol/L 4.2 4.0 4.1  --  3.8   CHLORIDE mmol/L 102 106 106  --  102   CO2 mmol/L 21.0 24.0 22.0  --  21.0   BUN mg/dL 14 18 17  --  17   CREATININE mg/dL 0.70 0.60 0.60  --  0.80    GLUCOSE mg/dL 80 98 124*  --  151*   CALCIUM mg/dL 9.6 10.4 10.2  --  10.7*   ALT (SGPT) U/L  --   --   --   --  20   AST (SGOT) U/L  --   --   --   --  32   TROPONIN I ng/mL  --   --  1.480* 0.054* 0.043*     Estimated Creatinine Clearance: 104.3 mL/min (by C-G formula based on Cr of 0.7).  No results found for: BNP  No results found for: PHART    Microbiology Results Abnormal     Procedure Component Value - Date/Time    Blood Culture - Blood, [[id number]]  (Normal) Collected:  01/30/18 0734    Lab Status:  Preliminary result Specimen:  Blood from Arm, Left Updated:  02/03/18 0816     Blood Culture No growth at 4 days    Blood Culture - Blood, [[id number]]  (Normal) Collected:  01/30/18 0748    Lab Status:  Preliminary result Specimen:  Blood from Hand, Right Updated:  02/03/18 0816     Blood Culture No growth at 4 days    Urine Culture - Urine, Urine, Clean Catch [[id number]]  (Normal) Collected:  01/30/18 1452    Lab Status:  Final result Specimen:  Urine from Urine, Clean Catch Updated:  02/01/18 0841     Urine Culture No growth at 2 days          Imaging Results (last 24 hours)     ** No results found for the last 24 hours. **        Results for orders placed during the hospital encounter of 01/30/18   Adult Transthoracic Echo Complete W/ Cont if Necessary Per Protocol    Narrative · Left ventricular systolic function is normal.  · Mild tricuspid valve regurgitation is present.          I have reviewed the medications.    Assessment/Plan   Assessment / Plan     Hospital Problem List     * (Principal)A-fib with RVR    VHD (valvular heart disease) s/p AVR, MVR 2011    Acute on chronic diastolic congestive heart failure    Former smoker, quit 2010    Anticoagulated on Coumadin    H/O: CVA with residual memory loss, mild right sided weakness, 2010    Subtherapeutic international normalized ratio (INR)    Improved RUL Infiltrate (R/O CAP, atelectasis)             Brief Hospital Course to date:  Lavern Youngblood is a 52  y.o. 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  She presented to OSH ED with 2-3 days of dry cough, congestion and increasing HERNANDEZ.  Patient also noted associated increased BLE edema despite taking her home Lasix dose.  At OSH ED found to have RUL pneumonia and was in Afib with RVR.  Her BP's were low but improved with IVF's and correction of RVR to promote better effective stroke volumes.  Transferred to [facility] for evaluation and treatment as Dr. Wilson is her primary Cardiologist. On the night after admission, pt became hemodynamically unstable, HR 150s, SBP 60s, and RR 40s. She was transferred to the ICU on 1/30 and returned to regular floor on 2/1 and IM assuming care on 2/2.        Assessment & Plan:  - Management of CHF/Afib with RVR per cardiology, HR not well controlled, still in the 130s this morning while at rest, but per record, most of the night had been in the 90s. BP a little low, but pt is asymptomatic. Will defer to card to adjust meds.  - Cont on Augmentin for sinusitis, not sure if pt actually has pneumonia. CXR on 1/30 showed RUL opacity, but resolved on repeat CXR only 13 hrs later. Augmentin should cover for CAP also. Leukocytosis improving. Pt is afebrile/nontoxic  - INR subtherapeutic, but trending up, pharm to manage, goal 2.5-3.5 for mechanical valve.  - PT/ CM to eval for DC planning  - nothing new to add from IM.      DVT Prophylaxis:  Coumadin     CODE STATUS: Full Code     Disposition: TBD, probably home once medically stable from card standpoint.    Manuel Angulo MD  02/03/18  1:01 PM

## 2018-02-03 NOTE — PLAN OF CARE
Problem: Patient Care Overview (Adult)  Goal: Plan of Care Review  Outcome: Ongoing (interventions implemented as appropriate)   02/03/18 0653   Coping/Psychosocial Response Interventions   Plan Of Care Reviewed With patient   Patient Care Overview   Progress progress toward functional goals is gradual   Outcome Evaluation   Outcome Summary/Follow up Plan vss and pt is in more controlled rate. Ambulating well.

## 2018-02-03 NOTE — THERAPY TREATMENT NOTE
Acute Care - Physical Therapy Treatment Note  [facility]     Patient Name: Lavern Youngblood  : 1965  MRN: [id number]  Today's Date: 2/3/2018  Onset of Illness/Injury or Date of Surgery Date: 18  Date of Referral to PT: 18  Referring Physician: ALLA Cook    Admit Date: 2018    Visit Dx:    ICD-10-CM ICD-9-CM   1. 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   • H/O: CVA with residual memory loss, mild right sided weakness,    • Subtherapeutic international normalized ratio (INR)   • Improved RUL Infiltrate (R/O CAP, atelectasis)               Adult Rehabilitation Note       18 1545 18 1526 18 1600    Rehab Assessment/Intervention    Discipline physical therapist  -LS physical therapist  -LM --  -KR    Document Type therapy note (daily note)  -LS therapy note (daily note)  -LM --  -KR    Subjective Information agree to therapy   pt said drags R foot sometimes;fam asking for something toA   -LS agree to therapy;no complaints  -LM --  -KR    Patient Effort, Rehab Treatment excellent  -LS excellent  -LM --  -KR    Symptoms Noted During/After Treatment  none  -LM --  -KR    Symptoms Noted Comment   --  -KR    Precautions/Limitations cardiac precautions  -LS fall precautions  -LM --  -KR    Recorded by [LS] Sherine Cohen, PT [LM] Luna Dos Santos, PT [KR] Kiarra Holcomb, PT    Vital Signs    Pre Systolic BP Rehab  91  -LM --  -KR    Pre Treatment Diastolic BP  58  -LM --  -KR    Post Systolic BP Rehab  99  -LM --  -KR    Post Treatment Diastolic BP  79  -LM --  -KR    Pretreatment Heart Rate (beats/min)   --  -KR    Intratreatment Heart Rate (beats/min)  138   s/p  800 feet of amb; Returned to normal with rest  -LM --  -KR    Posttreatment Heart Rate (beats/min)  106  -LM --  -KR    Pre SpO2 (%)   --  -KR    O2 Delivery Pre Treatment   --  -KR    Intra SpO2 (%)   --  -KR    O2 Delivery Intra Treatment   --  -KR    Post SpO2 (%)   --  -KR    O2 Delivery Post Treatment   --  -KR    Pre Patient Position  Sitting  -LM --  -KR    Intra Patient Position  Standing  -LM --  -KR    Post Patient Position  Sitting  -LM --  -KR    Recorded by  [LM] Luna Dos Santos, PT [KR] Kiarra Holcomb, PT    Pain Assessment    Pain Assessment  0-10  -LM --  -KR    Pain Score  0  -LM --  -KR    Post Pain Score  0  -LM --  -KR    Recorded by  [LM] Luna Dos Santos, PT [KR] Kiarra Holcomb, PT    Cognitive Assessment/Intervention    Current Cognitive/Communication Assessment  functional  -LM --  -KR    Orientation Status  oriented x 4  -LM --  -KR    Follows Commands/Answers Questions  100% of the time;able to follow single-step instructions  -LM --  -KR    Personal Safety  WNL/WFL  -LM --  -KR    Personal Safety Interventions  fall prevention program maintained;gait belt;muscle strengthening facilitated;nonskid shoes/slippers when out of bed  -LM --  -KR    Recorded by  [LM] Luna Do sSantos, PT [KR] Kiarra Holcomb, PT    Bed Mobility, Assessment/Treatment    Bed Mob, Supine to Sit, [place] independent  -LS      Bed Mob, Sit to Supine, [place] conditional independence   bed rail  -LS      Recorded by [LS] Sherine Cohen, PT      Transfer Assessment/Treatment    Transfers, Sit-Stand [place] conditional independence  -LS stand by assist  -LM     Transfers, Stand-Sit [place] conditional independence  -LS stand by assist  -LM     Transfers, Sit-Stand-Sit, Assist Device rolling walker  -LS rolling walker  -LM     Recorded by [LS] Sherine Cohen, PT [LM] Luna Dos Santos, PT     Gait Assessment/Treatment    Gait, [place] Level supervision required  -LS stand by assist  -LM     Gait,  Assistive Device rolling walker  -LS rolling walker  -LM     Gait, Distance (Feet) 500  -  -LM     Gait, Gait Pattern Analysis  swing-through gait  -LM     Gait, Gait Deviations  forward flexed posture  -LM     Gait, Safety Issues  weight-shifting ability decreased  -LM     Gait, Impairments  strength decreased  -LM     Gait, Comment decreased clearance R foot compared to L. Pt said she sometimes drags her foot worse and that it affects her balance. Discused a brace (AFO); pt would like to try one to see if it helps her walking.  -LS VC's for upright posture and to stay inside the walker  -LM     Recorded by [LS] Sherine Cohen, PT [LM] Luna Dos Santos PT     Stairs Assessment/Treatment    Stairs, [place] Level  not tested  -LM     Recorded by  [LM] Luna Dos Santos PT     Motor Skills/Interventions    Additional Documentation  Balance Skills Training (Group)  -LM     Recorded by  [LM] Luna Dos Santos PT     Balance Skills Training    Sitting-Level of Assistance  Independent  -LM     Sitting-Balance Support  Feet supported  -LM     Standing-Level of Assistance  Close supervision  -LM     Static Standing Balance Support  assistive device  -LM     Gait Balance-Level of Assistance  Close supervision  -LM     Gait Balance Support  assistive device  -LM     Recorded by  [LM] Luna Dos Santos PT     Therapy Exercises    Bilateral Lower Extremities AROM:;10 reps;sitting;hip flexion;supine;ankle pumps/circles;SLR   3/5 strength R ankle dorsiflexors  -LS AROM:;20 reps;sitting;hip flexion;LAQ;hip abduction/adduction   x10 sit to stands using UE support  -LM     Recorded by [LS] Sherine Cohen, PT [LM] Luna Dos Santos PT     Positioning and Restraints    Pre-Treatment Position in bed  -LS sitting in chair/recliner  -LM     Post Treatment Position bed  -LS chair  -LM     In Bed notified nsg;supine;call light within reach;with family/caregiver  -LS      In Chair  sitting;call light within reach;encouraged to call for  assist;with family/caregiver;notified nsg  -LM     Recorded by [LS] Sherine Cohen, PT [LM] Luna Dos Santos, PT       02/01/18 3502          Rehab Assessment/Intervention    Discipline physical therapist  -KR      Document Type therapy note (daily note)  -KR      Subjective Information agree to therapy;no complaints  -KR      Patient Effort, Rehab Treatment excellent  -KR      Symptoms Noted During/After Treatment significant change in vital signs  -KR      Symptoms Noted Comment Elevated HR with increased activity  -KR      Precautions/Limitations fall precautions;cardiac precautions  -KR      Recorded by [KR] Kiarra Holcomb, PT      Vital Signs    Pre Systolic BP Rehab 109  -KR      Pre Treatment Diastolic BP 90  -KR      Post Systolic BP Rehab 133  -KR      Post Treatment Diastolic BP 96  -KR      Pretreatment Heart Rate (beats/min) 87  -KR      Intratreatment Heart Rate (beats/min) 123  -KR      Posttreatment Heart Rate (beats/min) 104  -KR      Pre SpO2 (%) 96  -KR      O2 Delivery Pre Treatment room air  -KR      Intra SpO2 (%) 95  -KR      O2 Delivery Intra Treatment room air  -KR      Post SpO2 (%) 97  -KR      O2 Delivery Post Treatment room air  -KR      Pre Patient Position Sitting  -KR      Intra Patient Position Standing  -KR      Post Patient Position Sitting  -KR      Recorded by [KR] Kiarra Holcomb, PT      Pain Assessment    Pain Assessment 0-10  -KR      Pain Score 0  -KR      Post Pain Score 0  -KR      Recorded by [UZIEL] Kiarra Holcomb PT      Cognitive Assessment/Intervention    Current Cognitive/Communication Assessment functional  -KR      Orientation Status oriented x 4  -KR      Follows Commands/Answers Questions able to follow single-step instructions;100% of the time  -KR      Personal Safety WNL/WFL  -KR      Personal Safety Interventions fall prevention program maintained;gait belt;nonskid shoes/slippers when out of bed  -KR      Recorded by [KR] Kiarra Holcomb, PT      Bed Mobility,  Assessment/Treatment    Bed Mob, Supine to Sit, [place] not tested  -KR      Bed Mob, Sit to Supine, [place] not tested  -KR      Bed Mobility, Comment UIC  -KR      Recorded by [KR] Kiarra Holcomb PT      Transfer Assessment/Treatment    Transfers, Sit-Stand [place] contact guard assist;verbal cues required  -KR      Transfers, Stand-Sit [place] contact guard assist;verbal cues required  -KR      Transfers, Sit-Stand-Sit, Assist Device rolling walker  -KR      Transfer, Safety Issues step length decreased  -KR      Transfer, Comment Pt given VC's for correct hand placement.   -KR      Recorded by [KR] Kiarra Holcomb PT      Gait Assessment/Treatment    Gait, [place] Level contact guard assist;1 person + 1 person to manage equipment;verbal cues required  -KR      Gait, Assistive Device rolling walker  -KR      Gait, Distance (Feet) 300  -KR      Gait, Gait Pattern Analysis swing-through gait  -KR      Gait, Gait Deviations anna decreased;forward flexed posture;step length decreased  -KR      Gait, Safety Issues step length decreased  -KR      Gait, Impairments strength decreased  -KR      Gait, Comment Pt demonstrated slow gait speed with decreased step length, but had no periods of decreased balance. O2 sats remained in mid 90s; elevated HR noted with increased mobility.   -KR      Recorded by [KR] Kiarra Holcomb PT      Motor Skills/Interventions    Additional Documentation Balance Skills Training (Group)  -KR      Recorded by [KR] Kiarra Holcomb PT      Balance Skills Training    Sitting-Level of Assistance Distant supervision  -KR      Sitting-Balance Support Feet supported  -KR      Standing-Level of Assistance Contact guard  -KR      Static Standing Balance Support assistive device  -KR      Gait Balance-Level of Assistance Contact guard  -KR      Gait Balance Support assistive device  -KR      Recorded by [KR] Kiarra Holcomb PT      Therapy Exercises    Bilateral Lower  Extremities AROM:;10 reps;sitting;ankle pumps/circles;hip flexion;LAQ  -KR      Recorded by [KR] Kiarra Holcomb, PT      Positioning and Restraints    Pre-Treatment Position sitting in chair/recliner  -KR      Post Treatment Position chair  -KR      In Chair notified nsg;reclined;call light within reach;encouraged to call for assist;waffle cushion;legs elevated  -KR      Recorded by [KR] Kiarra Holcomb, PT        User Key  (r) = Recorded By, (t) = Taken By, (c) = Cosigned By    Initials Name Effective Dates    LS Sherine Cohen, PT 06/19/15 -     LM Luna Levon, PT 06/15/16 -     KR Kiarra Holcomb, PT 09/25/17 -                 IP PT Goals       02/03/18 1637 02/02/18 1526 02/01/18 1623    Bed Mobility PT LTG    Bed Mobility PT LTG, Outcome goal ongoing  -LS goal ongoing  -LM goal ongoing  -KR    Transfer Training PT LTG    Transfer Training PT LTG, Outcome goal met  -LS goal ongoing  -LM goal ongoing  -KR    Gait Training PT LTG    Gait Training Goal PT LTG, Outcome goal ongoing  -LS goal ongoing  -LM goal ongoing  -KR    Stair Training PT LTG    Stair Training Goal PT LTG, Outcome goal ongoing  -LS goal ongoing  -LM goal ongoing  -KR      01/31/18 1349          Bed Mobility PT LTG    Bed Mobility PT LTG, Date Established 01/31/18  -LSA      Bed Mobility PT LTG, Time to Achieve 2 wks  -LSA      Bed Mobility PT LTG, Activity Type supine to sit/sit to supine  -LSA      Bed Mobility PT LTG, [place] Level independent  -LSA      Transfer Training PT LTG    Transfer Training PT LTG, Date Established 01/31/18  -LSA      Transfer Training PT LTG, Time to Achieve 2 wks  -LSA      Transfer Training PT LTG, Activity Type sit to stand/stand to sit  -LSA      Transfer Training PT LTG, [place] Level conditional independence  -LSA      Gait Training PT LTG    Gait Training Goal PT LTG, Date Established 01/31/18  -LSA      Gait Training Goal PT LTG, Time to Achieve 2 wks  -LSA      Gait Training Goal PT LTG,  [place] Level conditional independence  -LSA      Gait Training Goal PT LTG, Assist Device walker, rolling  -LSA      Gait Training Goal PT LTG, Distance to Achieve 400  -LSA      Stair Training PT LTG    Stair Training Goal PT LTG, Date Established 01/31/18  -LSA      Stair Training Goal PT LTG, Time to Achieve 2 wks  -LSA      Stair Training Goal PT LTG, Number of Steps 2  -LSA      Stair Training Goal PT LTG, [place] Level supervision required  -LSA      Stair Training Goal PT LTG, Assist Device 1 handrail  -LSA        User Key  (r) = Recorded By, (t) = Taken By, (c) = Cosigned By    Initials Name Provider Type    LS Sherine Cohen, PT Physical Therapist    LSLIDIA Atkinson, PT Physical Therapist    LM Luna Dos Santos, PT Physical Therapist    UZIEL Holcomb, PT Physical Therapist          Physical Therapy Education     Title: PT OT SLP Therapies (Active)     Topic: Physical Therapy (Active)     Point: Mobility training (Done)    Learning Progress Summary    Learner Readiness Method Response Comment Documented by Status   Patient Acceptance E VU,NR  LM 02/02/18 1607 Done    Acceptance E NR  KR 02/01/18 1623 Active    Acceptance E,D NR   01/31/18 1349 Active               Point: Home exercise program (Done)    Learning Progress Summary    Learner Readiness Method Response Comment Documented by Status   Patient Acceptance E VU Encouraged pt do work on aps BLE for circulation. Discussed that pt would benefit from theraband for R ankle dorsiflexor strengthening as well as a trial w/AFO R foot for ambulation. LS1 02/03/18 1635 Done    Acceptance E NR  KR 02/01/18 1623 Active    Acceptance E,D NR  LS 01/31/18 1349 Active               Point: Body mechanics (Active)    Learning Progress Summary    Learner Readiness Method Response Comment Documented by Status   Patient Acceptance E NR  KR 02/01/18 1623 Active    Acceptance E,D NR  LS 01/31/18 1349 Active               Point: Precautions (Done)     Learning Progress Summary    Learner Readiness Method Response Comment Documented by Status   Patient Acceptance E VU,NR  LM 02/02/18 1607 Done    Acceptance E NR  KR 02/01/18 1623 Active    Acceptance E,D NR  LS 01/31/18 1349 Active                      User Key     Initials Effective Dates Name Provider Type Discipline    LS1 06/19/15 -  Sherine Cohen, PT Physical Therapist PT    LS 06/19/15 -  Joycelyn Atkinson, PT Physical Therapist PT    LM 06/15/16 -  Luna Dos [place]ots, PT Physical Therapist PT    KR 09/25/17 -  Kiarra Holcomb, PT Physical Therapist PT                    PT Recommendation and Plan  Anticipated Equipment Needs At Discharge: front wheeled walker  Anticipated Discharge Disposition: home with assist  PT Frequency: daily  Plan of Care Review  Plan Of Care Reviewed With: patient  Outcome Summary/Follow up Plan: Increasing independence w/transfers and ambulation. Would benefit from HEP R ankle dorsiflexor strengthening w/theraband and assessment of amb w/R AFO.          Outcome Measures       02/03/18 1545 02/02/18 1526 02/01/18 1539    How much help from another person do you currently need...    Turning from your back to your side while in flat bed without using bedrails? 4  -LS 4  -LM 3  -KR    Moving from lying on back to sitting on the side of a flat bed without bedrails? 4  -LS 4  -LM 3  -KR    Moving to and from a bed to a chair (including a wheelchair)? 4  -LS 3  -LM 3  -KR    Standing up from a chair using your arms (e.g., wheelchair, bedside chair)? 4  -LS 3  -LM 3  -KR    Climbing 3-5 steps with a railing? 3  -LS 3  -LM 3  -KR    To walk in hospital room? 3  -LS 3  -LM 3  -KR    AM-PAC 6 Clicks Score 22  -LS 20  -LM 18  -KR    Functional Assessment    Outcome Measure Options AM-PAC 6 Clicks Basic Mobility (PT)  -LS AM-PAC 6 Clicks Basic Mobility (PT)  -LM AM-PAC 6 Clicks Basic Mobility (PT)  -KR      User Key  (r) = Recorded By, (t) = Taken By, (c) = Cosigned By    Initials Name Provider Type      Sherine Cohen, PT Physical Therapist    SHILPA Dos Santos, PT Physical Therapist    UZIEL Holcomb, PT Physical Therapist           Time Calculation:         PT Charges       02/03/18 1639          Time Calculation    Start Time 1545  -      PT Received On 02/03/18  -      PT Goal Re-Cert Due Date 02/10/18  -        User Key  (r) = Recorded By, (t) = Taken By, (c) = Cosigned By    Initials Name Provider Type     Sherine Cohen, PT Physical Therapist          Therapy Charges for Today     Code Description Service Date Service Provider Modifiers Qty    09222738493 HC PT THER PROC EA 15 MIN 2/3/2018 Sherine Cohen, PT GP 1          PT G-Codes  Outcome Measure Options: AM-PAC 6 Clicks Basic Mobility (PT)    Sherine Cohen, PT  2/3/2018

## 2018-02-03 NOTE — PROGRESS NOTES
"Pharmacy Consult  -  Warfarin    Lavern Youngblood is a  52 y.o. female   Height - 162.6 cm (64\")  Weight - 88 kg89.6 kg (197 lb 9.6 oz)    Consulting Provider: - Britt Cantu MD  Indication: - Mechanical Mitral & Aortic Valve, AFib  Goal INR: - 2.5-3.5  Home Regimen:   - Warfarin 7 mg daily     Bridge Therapy: Yes   and enoxapain 90 mg SQ q12h     Drug-Drug Interactions with current regimen:   Amiodarone - increase INR - major drug drug interaction (started 1/30/18)              Augmentin - increase risk of bleeding              Enoxaparin - increase risk of bleeding    Warfarin Dosing During Admission:    Date  1/30 1/31 2/1 2/2 2/3       INR  1.28 1.42 1.75 1.86 2.05       Dose  6 mg 6 mg 7 mg 7 mg (7 mg)            Education Provided:  Follow-up during admission    Labs:  Results from last 7 days   Lab Units 02/03/18  0636 02/02/18 0606 02/01/18 0452 01/31/18 0522 01/30/18 2009 01/30/18  0748   INR  2.05 1.86 1.75 1.42 --  1.28   HEMOGLOBIN g/dL --  13.1 13.5 13.1 13.7 15.0   HEMATOCRIT % --  41.3 41.7 40.1 42.1 44.4*   PLATELETS 10*3/mm3 --  283 267 290 301 329     Results from last 7 days   Lab Units 02/02/18  0606 02/01/18 0452 01/31/18 0522 01/30/18  0748   SODIUM mmol/L 133 135 135 134   POTASSIUM mmol/L 4.2 4.0 4.1 3.8   CHLORIDE mmol/L 102 106 106 102   CO2 mmol/L 21.0 24.0 22.0 21.0   BUN mg/dL 14 18 17 17   CREATININE mg/dL 0.70 0.60 0.60 0.80   CALCIUM mg/dL 9.6 10.4 10.2 10.7*   BILIRUBIN mg/dL --  --  --  1.0   ALK PHOS U/L --  --  --  75   ALT (SGPT) U/L --  --  --  20   AST (SGOT) U/L --  --  --  32   GLUCOSE mg/dL 80 98 124* 151*     Current dietary intake: % meals on 2/2 per documentation    Diet Order   Procedures   • Diet Regular; Cardiac     Assessment/Plan:     1. INR of 2.05 remains subtherapeutic, increased from 1.86 yesterday. Goal INR of 2.5-3.5.     2. Continue to bridge with enoxaparin 90 mg (1 mg/kg) SQ q12h until INR is therapeutic x 2 consecutive reads ( > 24 hrs).    3. " Amiodarone started 1/30/18 ~22:00 = Day 4 of therapy. Anticipated onset of drug-drug interaction between amiodarone and warfarin is 3-7 days from initiation of therapy (expect 50% dose reduction compared to admission regimen)    4. Will continue warfarin 7mg daily for now.    5. Pharmacy will continue to monitor and adjust warfarin dose as necessary based on labs, drug interactions, diet, and clinical status.     Thank you,  Licha Villatoro RP  2/3/2018  9:07 AM

## 2018-02-04 LAB
BACTERIA SPEC AEROBE CULT: NORMAL
BACTERIA SPEC AEROBE CULT: NORMAL
INR PPP: 2.24
PROTHROMBIN TIME: 25 SECONDS (ref 9.6–11.5)

## 2018-02-04 PROCEDURE — 99232 SBSQ HOSP IP/OBS MODERATE 35: CPT | Performed by: HOSPITALIST

## 2018-02-04 PROCEDURE — 25010000002 ENOXAPARIN PER 10 MG

## 2018-02-04 PROCEDURE — 97116 GAIT TRAINING THERAPY: CPT

## 2018-02-04 PROCEDURE — 97110 THERAPEUTIC EXERCISES: CPT

## 2018-02-04 PROCEDURE — 85610 PROTHROMBIN TIME: CPT

## 2018-02-04 RX ADMIN — ENOXAPARIN SODIUM 90 MG: 100 INJECTION SUBCUTANEOUS at 20:02

## 2018-02-04 RX ADMIN — AMIODARONE HYDROCHLORIDE 200 MG: 200 TABLET ORAL at 16:31

## 2018-02-04 RX ADMIN — POTASSIUM CHLORIDE 20 MEQ: 750 CAPSULE, EXTENDED RELEASE ORAL at 08:32

## 2018-02-04 RX ADMIN — WARFARIN SODIUM 7 MG: 5 TABLET ORAL at 17:09

## 2018-02-04 RX ADMIN — Medication 2 TABLET: at 20:03

## 2018-02-04 RX ADMIN — ATORVASTATIN CALCIUM 10 MG: 10 TABLET, FILM COATED ORAL at 20:04

## 2018-02-04 RX ADMIN — AMOXICILLIN AND CLAVULANATE POTASSIUM 1 TABLET: 875; 125 TABLET, FILM COATED ORAL at 20:03

## 2018-02-04 RX ADMIN — AMIODARONE HYDROCHLORIDE 200 MG: 200 TABLET ORAL at 06:15

## 2018-02-04 RX ADMIN — FLUTICASONE PROPIONATE 2 SPRAY: 50 SPRAY, METERED NASAL at 08:32

## 2018-02-04 RX ADMIN — NYSTATIN: 100000 POWDER TOPICAL at 08:32

## 2018-02-04 RX ADMIN — METOPROLOL TARTRATE 12.5 MG: 25 TABLET, FILM COATED ORAL at 08:32

## 2018-02-04 RX ADMIN — AMIODARONE HYDROCHLORIDE 200 MG: 200 TABLET ORAL at 20:02

## 2018-02-04 RX ADMIN — POTASSIUM CHLORIDE 20 MEQ: 750 CAPSULE, EXTENDED RELEASE ORAL at 20:08

## 2018-02-04 RX ADMIN — NYSTATIN: 100000 POWDER TOPICAL at 20:01

## 2018-02-04 RX ADMIN — ENOXAPARIN SODIUM 90 MG: 100 INJECTION SUBCUTANEOUS at 08:32

## 2018-02-04 RX ADMIN — METOPROLOL TARTRATE 12.5 MG: 25 TABLET, FILM COATED ORAL at 20:04

## 2018-02-04 RX ADMIN — FAMOTIDINE 20 MG: 20 TABLET, FILM COATED ORAL at 08:32

## 2018-02-04 RX ADMIN — AMOXICILLIN AND CLAVULANATE POTASSIUM 1 TABLET: 875; 125 TABLET, FILM COATED ORAL at 08:32

## 2018-02-04 NOTE — PLAN OF CARE
Problem: Patient Care Overview (Adult)  Goal: Plan of Care Review  Outcome: Ongoing (interventions implemented as appropriate)   02/04/18 0910   Coping/Psychosocial Response Interventions   Plan Of Care Reviewed With patient   Patient Care Overview   Progress improving   Outcome Evaluation   Outcome Summary/Follow up Plan Independently walking w/RW. Gait improved w/R AFO which was issued to pt.       Problem: Inpatient Physical Therapy  Goal: Bed Mobility Goal LTG- PT  Outcome: Outcome(s) achieved Date Met: 02/04/18 01/31/18 1349 02/04/18 0910   Bed Mobility PT LTG   Bed Mobility PT LTG, Date Established 01/31/18 --    Bed Mobility PT LTG, Time to Achieve 2 wks --    Bed Mobility PT LTG, Activity Type supine to sit/sit to supine --    Bed Mobility PT LTG, [place] Level independent --    Bed Mobility PT LTG, Outcome --  goal met     Goal: Gait Training Goal LTG- PT  Outcome: Outcome(s) achieved Date Met: 02/04/18 01/31/18 1349 02/04/18 0910   Gait Training PT LTG   Gait Training Goal PT LTG, Date Established 01/31/18 --    Gait Training Goal PT LTG, Time to Achieve 2 wks --    Gait Training Goal PT LTG, [place] Level conditional independence --    Gait Training Goal PT LTG, Assist Device walker, rolling --    Gait Training Goal PT LTG, Distance to Achieve 400 --    Gait Training Goal PT LTG, Outcome --  goal met     Goal: Stair Training Goal LTG- PT  Outcome: Ongoing (interventions implemented as appropriate)   01/31/18 1349 02/04/18 0910   Stair Training PT LTG   Stair Training Goal PT LTG, Date Established 01/31/18 --    Stair Training Goal PT LTG, Time to Achieve 2 wks --    Stair Training Goal PT LTG, Number of Steps 2 --    Stair Training Goal PT LTG, [place] Level supervision required --    Stair Training Goal PT LTG, Assist Device 1 handrail --    Stair Training Goal PT LTG, Outcome --  goal ongoing

## 2018-02-04 NOTE — PLAN OF CARE
Problem: Patient Care Overview (Adult)  Goal: Plan of Care Review  Outcome: Ongoing (interventions implemented as appropriate)   02/04/18 6525   Coping/Psychosocial Response Interventions   Plan Of Care Reviewed With patient   Patient Care Overview   Progress improving   Outcome Evaluation   Outcome Summary/Follow up Plan Patients rate more controlled, in 90's to 110's. No complaints     Goal: Adult Individualization and Mutuality  Outcome: Ongoing (interventions implemented as appropriate)    Goal: Discharge Needs Assessment  Outcome: Ongoing (interventions implemented as appropriate)      Problem: Sepsis (Adult)  Goal: Signs and Symptoms of Listed Potential Problems Will be Absent or Manageable (Sepsis)  Outcome: Ongoing (interventions implemented as appropriate)      Problem: Arrhythmia/Dysrhythmia (Symptomatic) (Adult)  Goal: Signs and Symptoms of Listed Potential Problems Will be Absent or Manageable (Arrhythmia/Dysrhythmia)  Outcome: Ongoing (interventions implemented as appropriate)      Problem: Pressure Ulcer Risk (Toms[place] Scale) (Adult,Obstetrics,Pediatric)  Goal: Identify Related Risk Factors and Signs and Symptoms  Outcome: Ongoing (interventions implemented as appropriate)    Goal: Skin Integrity  Outcome: Ongoing (interventions implemented as appropriate)

## 2018-02-04 NOTE — PROGRESS NOTES
[facility] Medicine Services  PROGRESS NOTE    Patient Name: Lavern Youngblood  : 1965  MRN: [id number]    Date of Admission: 2018  Length of Stay: 5  Primary Care Physician: No Known Provider    Subjective   Subjective     CC:  F/u Afib with RVR and CHF    HPI:  Doing well, no complaints. No acute from O/N. Still in afib on tele, but rate is controlled.    Review of Systems  Otherwise ROS is negative except as mentioned in the HPI.    Objective   Objective     Vital Signs:   Temp:  [97.4 °F (36.3 °C)-97.6 °F (36.4 °C)] 97.4 °F (36.3 °C)  Heart Rate:  [] 84  Resp:  [18-24] 24  BP: (101-119)/(70-91) 113/87        Physical Exam:  General Assessment: No acute cardiopulmonary distress.       HEENT: NCAT, PERRL, MM moist      Neck: Supple      CVS: Irreg rhythm, reg rate, S1S2 normal, no murmurs      Resp: Good air flow, + crackles bilat bases, mild scattered wheezing, resp non-labored      Abd: soft, NT, ND, normal BS, no guarding or peritoneal signs      Ext: No edema, both calves are symmetric and NTTP      Neuro: No facial asymmetry, speech clear      Skin: W/D/I. No rash.      Psych: Affect is appropriate       Results Reviewed:  I have personally reviewed current lab, radiology, and data and agree.      Results from last 7 days  Lab Units 18   WBC 10*3/mm3  --   --  7.92 19.14* 28.32*   HEMOGLOBIN g/dL  --   --  13.1 13.5 13.1   HEMATOCRIT %  --   --  41.3 41.7 40.1   PLATELETS 10*3/mm3  --   --  283 267 290   INR  2.24 2.05 1.86 1.75 1.42       Results from last 7 days  Lab Units 18  0618  0748   SODIUM mmol/L 133 135 135  --  134   POTASSIUM mmol/L 4.2 4.0 4.1  --  3.8   CHLORIDE mmol/L 102 106 106  --  102   CO2 mmol/L 21.0 24.0 22.0  --  21.0   BUN mg/dL 14 18 17  --  17   CREATININE mg/dL 0.70 0.60 0.60  --  0.80   GLUCOSE mg/dL 80 98 124*   --  151*   CALCIUM mg/dL 9.6 10.4 10.2  --  10.7*   ALT (SGPT) U/L  --   --   --   --  20   AST (SGOT) U/L  --   --   --   --  32   TROPONIN I ng/mL  --   --  1.480* 0.054* 0.043*     Estimated Creatinine Clearance: 104.3 mL/min (by C-G formula based on Cr of 0.7).  No results found for: BNP  No results found for: PHART    Microbiology Results Abnormal     Procedure Component Value - Date/Time    Blood Culture - Blood, [[id number]]  (Normal) Collected:  01/30/18 0734    Lab Status:  Final result Specimen:  Blood from Arm, Left Updated:  02/04/18 0816     Blood Culture No growth at 5 days    Blood Culture - Blood, [[id number]]  (Normal) Collected:  01/30/18 0748    Lab Status:  Final result Specimen:  Blood from Hand, Right Updated:  02/04/18 0816     Blood Culture No growth at 5 days    Urine Culture - Urine, Urine, Clean Catch [[id number]]  (Normal) Collected:  01/30/18 1452    Lab Status:  Final result Specimen:  Urine from Urine, Clean Catch Updated:  02/01/18 0841     Urine Culture No growth at 2 days          Imaging Results (last 24 hours)     ** No results found for the last 24 hours. **        Results for orders placed during the hospital encounter of 01/30/18   Adult Transthoracic Echo Complete W/ Cont if Necessary Per Protocol    Narrative · Left ventricular systolic function is normal.  · Mild tricuspid valve regurgitation is present.          I have reviewed the medications.    Assessment/Plan   Assessment / Plan     Hospital Problem List     * (Principal)A-fib with RVR    VHD (valvular heart disease) s/p AVR, MVR 2011    Acute on chronic diastolic congestive heart failure    Former smoker, quit 2010    Anticoagulated on Coumadin    H/O: CVA with residual memory loss, mild right sided weakness, 2010    Subtherapeutic international normalized ratio (INR)    Improved RUL Infiltrate (R/O CAP, atelectasis)             Brief Hospital Course to date:  Lavern Youngblood is a 52 y.o. female with a known history of PAF,  rheumatic VHD status post previous mechanical aortic and mitral valve replacements now chronically anticoagulated with Coumadin.  She presented to OSH ED with 2-3 days of dry cough, congestion and increasing HERNANDEZ.  Patient also noted associated increased BLE edema despite taking her home Lasix dose.  At OSH ED found to have RUL pneumonia and was in Afib with RVR.  Her BP's were low but improved with IVF's and correction of RVR to promote better effective stroke volumes.  Transferred to [facility] for evaluation and treatment as Dr. Wilson is her primary Cardiologist. On the night after admission, pt became hemodynamically unstable, HR 150s, SBP 60s, and RR 40s. She was transferred to the ICU on 1/30 and returned to regular floor on 2/1 and IM assuming care on 2/2.          Assessment & Plan:  - Management of CHF/Afib with RVR per cardiology, HR and BP better controlled and stable now.  - Cont on Augmentin for sinusitis, not sure if pt actually has pneumonia. CXR on 1/30 showed RUL opacity, but resolved on repeat CXR only 13 hrs later. Augmentin should cover for CAP also. Leukocytosis improving. Pt is afebrile/nontoxic  - INR subtherapeutic, but trending up and getting close to therapeutic level, pharm to manage, goal 2.5-3.5 for mechanical valve. I expect it to be therapeutic tomorrow. Currently being bridged with lovenox.  - nothing new to add from IM.       DVT Prophylaxis:  Coumadin      CODE STATUS: Full Code      Disposition: Hopefully home tomorrow if stable and INR is therapeutic.       Manuel Angulo MD  02/04/18  3:11 PM

## 2018-02-04 NOTE — PROGRESS NOTES
"Pharmacy Consult  -  Warfarin    Lavern Youngblood is a  52 y.o. female   Height - 162.6 cm (64\")  Weight - 88 kg89.6 kg (197 lb 9.6 oz)    Consulting Provider: - Britt Cantu MD  Indication: - Mechanical Mitral & Aortic Valve, AFib  Goal INR: - 2.5-3.5  Home Regimen:   - Warfarin 7 mg daily     Bridge Therapy: Yes   and enoxapain 90 mg SQ q12h     Drug-Drug Interactions with current regimen:   Amiodarone - increase INR - major drug drug interaction (started 1/30/18)              Augmentin - increase risk of bleeding              Enoxaparin - increase risk of bleeding    Warfarin Dosing During Admission:    Date  1/30 1/31 2/1 2/2 2/3 2/4      INR  1.28 1.42 1.75 1.86 2.05 2.24      Dose  6 mg 6 mg 7 mg 7 mg 7 mg  (7 mg)          Education Provided:  Follow-up during admission    Labs:  Results from last 7 days   Lab Units 02/04/18 0530 02/03/18 0636 02/02/18 0606 02/01/18 0452 01/31/18 0522 01/30/18 2009 01/30/18  0748   INR  2.24 2.05 1.86 1.75 1.42 --  1.28   HEMOGLOBIN g/dL --  --  13.1 13.5 13.1 13.7 15.0   HEMATOCRIT % --  --  41.3 41.7 40.1 42.1 44.4*   PLATELETS 10*3/mm3 --  --  283 267 290 301 329     Results from last 7 days   Lab Units 02/02/18  0606 02/01/18 0452 01/31/18 0522 01/30/18  0748   SODIUM mmol/L 133 135 135 134   POTASSIUM mmol/L 4.2 4.0 4.1 3.8   CHLORIDE mmol/L 102 106 106 102   CO2 mmol/L 21.0 24.0 22.0 21.0   BUN mg/dL 14 18 17 17   CREATININE mg/dL 0.70 0.60 0.60 0.80   CALCIUM mg/dL 9.6 10.4 10.2 10.7*   BILIRUBIN mg/dL --  --  --  1.0   ALK PHOS U/L --  --  --  75   ALT (SGPT) U/L --  --  --  20   AST (SGOT) U/L --  --  --  32   GLUCOSE mg/dL 80 98 124* 151*     Current dietary intake: 100% meals on 2/3 per documentation    Diet Order   Procedures   • Diet Regular; Cardiac     Assessment/Plan:     1. INR of 2.24 remains slightly subtherapeutic, increased from 2.05. Goal INR 2.5-3.5.     2. Continue to bridge with enoxaparin 90 mg (1 mg/kg) SQ q12h until INR is therapeutic x 2 " consecutive reads ( > 24 hrs).    3. Amiodarone therapy started 6 days ago. Anticipated onset of drug-drug interaction between amiodarone and warfarin is 3-7 days from initiation of therapy (expect 50% dose reduction compared to admission regimen).     4. Continue home regimen of warfarin 7 mg daily for now.    5. Pharmacy will continue to monitor and adjust warfarin dose as necessary based on labs, drug interactions, diet, and clinical status.     Thank you,  Jerilyn Hull RP  2/4/2018  7:37 AM

## 2018-02-04 NOTE — THERAPY TREATMENT NOTE
Acute Care - Physical Therapy Treatment Note  [facility]     Patient Name: Lavern Youngblood  : 1965  MRN: [id number]  Today's Date: 2018  Onset of Illness/Injury or Date of Surgery Date: 18  Date of Referral to PT: 18  Referring Physician: ALLA Cook    Admit Date: 2018    Visit Dx:    ICD-10-CM ICD-9-CM   1. 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   • H/O: CVA with residual memory loss, mild right sided weakness,    • Subtherapeutic international normalized ratio (INR)   • Improved RUL Infiltrate (R/O CAP, atelectasis)               Adult Rehabilitation Note       18 0800 18 1545 18 1526    Rehab Assessment/Intervention    Discipline physical therapist  -LS physical therapist  -LS physical therapist  -LM    Document Type therapy note (daily note)  -LS therapy note (daily note)  -LS therapy note (daily note)  -LM    Subjective Information agree to therapy   pt said AFO was helping her not drag her foot  -LS agree to therapy   pt said drags R foot sometimes;fam asking for something toA   -LS agree to therapy;no complaints  -LM    Patient Effort, Rehab Treatment excellent  -LS excellent  -LS excellent  -LM    Symptoms Noted During/After Treatment   none  -LM    Precautions/Limitations  cardiac precautions  -LS fall precautions  -LM    Recorded by [LS] Sherine Cohen, PT [LS] Sherine Cohen, PT [LM] Luna Dos Santos, PT    Vital Signs    Pre Systolic BP Rehab   91  -LM    Pre Treatment Diastolic BP   58  -LM    Post Systolic BP Rehab   99  -LM    Post Treatment Diastolic BP   79  -LM    Intratreatment Heart Rate (beats/min)   138   s/p 800 feet of  amb; Returned to normal with rest  -LM    Posttreatment Heart Rate (beats/min)   106  -LM    Pre Patient Position   Sitting  -LM    Intra Patient Position   Standing  -LM    Post Patient Position   Sitting  -LM    Recorded by   [LM] Luna Dos Santos, PT    Pain Assessment    Pain Assessment No/denies pain  -LS  0-10  -LM    Pain Score   0  -LM    Post Pain Score   0  -LM    Recorded by [LS] Sherine Cohen, PT  [LM] Luna Dos Santos, PT    Cognitive Assessment/Intervention    Current Cognitive/Communication Assessment   functional  -LM    Orientation Status   oriented x 4  -LM    Follows Commands/Answers Questions   100% of the time;able to follow single-step instructions  -LM    Personal Safety   WNL/WFL  -LM    Personal Safety Interventions   fall prevention program maintained;gait belt;muscle strengthening facilitated;nonskid shoes/slippers when out of bed  -LM    Recorded by   [LM] Luna Dos Santos, PT    Bed Mobility, Assessment/Treatment    Bed Mob, Supine to Sit, [place] independent  -LS independent  -LS     Bed Mob, Sit to Supine, [place] independent  -LS conditional independence   bed rail  -LS     Recorded by [LS] Sherine Cohen, PT [LS] Sherine Cohen, PT     Transfer Assessment/Treatment    Transfers, Sit-Stand [place] conditional independence  -LS conditional independence  -LS stand by assist  -LM    Transfers, Stand-Sit [place] conditional independence  -LS conditional independence  -LS stand by assist  -LM    Transfers, Sit-Stand-Sit, Assist Device rolling walker  -LS rolling walker  -LS rolling walker  -LM    Recorded by [LS] Sherine Cohen, PT [LS] Sherine Cohen, PT [LM] Luna Dos Santos, PT    Gait Assessment/Treatment    Gait, [place] Level conditional independence  -LS supervision required  -LS stand by assist  -LM    Gait, Assistive Device rolling walker  -LS rolling walker  -LS rolling walker  -LM    Gait, Distance (Feet) 800  -  -  -LM    Gait,  Gait Pattern Analysis   swing-through gait  -LM    Gait, Gait Deviations   forward flexed posture  -LM    Gait, Safety Issues   weight-shifting ability decreased  -LM    Gait, Impairments   strength decreased  -LM    Gait, Comment worked on gait training. occaisional vcs for heel to toe gait pattern R foot and for R foot clearance. Pt amb w/R AFO w/o Velcro around ankle initially; however, heel was slipping up and down. P.T. modified AFO, adding Velcro in figure 8 patttern around ankle. Educated pt in doing so. She had difficulty maintaining heel all of the way back in AFO when doing it herself but said family will help her. Written instructions issued as well.  -LS decreased clearance R foot compared to L. Pt said she sometimes drags her foot worse and that it affects her balance. Discused a brace (AFO); pt would like to try one to see if it helps her walking.  -LS VC's for upright posture and to stay inside the walker  -LM    Recorded by [LS] Sherine Cohen, PT [LS] Sherine Cohen, PT [LM] Luna Dos Santos, EDMUND    Stairs Assessment/Treatment    Stairs, [place] Level   not tested  -LM    Recorded by   [LM] Luna Dos Santos PT    Motor Skills/Interventions    Additional Documentation   Balance Skills Training (Group)  -LM    Recorded by   [LM] Luna Dos Santos, EDMUND    Balance Skills Training    Sitting-Level of Assistance   Independent  -LM    Sitting-Balance Support   Feet supported  -LM    Standing-Level of Assistance  Close supervision  -LS Close supervision  -LM    Static Standing Balance Support  --   RW  -LS assistive device  -LM    Standing-Balance Activities  Weight Shift A-P;Weight Shift R-L  -LS     Gait Balance-Level of Assistance   Close supervision  -LM    Gait Balance Support   assistive device  -LM    Recorded by  [LS] Sherine Cohen, PT [LM] Luna Dos Santos, EDMUND    Therapy Exercises    Right Lower Extremity AROM:;10 reps;ankle pumps/circles   AROM foot eversion  -LS      RLE Resistance theraband    ankle df X 10 w/red theraband  -LS      Bilateral Lower Extremities  AROM:;10 reps;sitting;hip flexion;supine;ankle pumps/circles;SLR   3/5 strength R ankle dorsiflexors  -LS AROM:;20 reps;sitting;hip flexion;LAQ;hip abduction/adduction   x10 sit to stands using UE support  -LM    Recorded by [LS] Sherine Cohen, PT [LS] Sherine Cohen PT [LM] Luna Dos Santos PT    Orthotics Prosthetics    Additional Documentation --   issued R AFO; added Velcro around ankle  -LS      Recorded by [LS] Sherine Cohen PT      Positioning and Restraints    Pre-Treatment Position  in bed  -LS sitting in chair/recliner  -LM    Post Treatment Position  bed  -LS chair  -LM    In Bed  notified nsg;supine;call light within reach;with family/caregiver  -LS     In Chair   sitting;call light within reach;encouraged to call for assist;with family/caregiver;notified nsg  -LM    Recorded by  [LS] Sherine Cohen PT [LM] Luna Dos Santos, PT      02/01/18 1600 02/01/18 1539       Rehab Assessment/Intervention    Discipline --  -KR physical therapist  -KR     Document Type --  -KR therapy note (daily note)  -KR     Subjective Information --  -KR agree to therapy;no complaints  -KR     Patient Effort, Rehab Treatment --  -KR excellent  -KR     Symptoms Noted During/After Treatment --  -KR significant change in vital signs  -KR     Symptoms Noted Comment --  -KR Elevated HR with increased activity  -KR     Precautions/Limitations --  -KR fall precautions;cardiac precautions  -KR     Recorded by [KR] Kiarra Holcomb PT [KR] Kiarra Holcomb, PT     Vital Signs    Pre Systolic BP Rehab --  -  -KR     Pre Treatment Diastolic BP --  -KR 90  -KR     Post Systolic BP Rehab --  -  -KR     Post Treatment Diastolic BP --  -KR 96  -KR     Pretreatment Heart Rate (beats/min) --  -KR 87  -KR     Intratreatment Heart Rate (beats/min) --  -  -KR     Posttreatment Heart Rate (beats/min) --  -  -KR     Pre SpO2 (%) --  -KR 96   -KR     O2 Delivery Pre Treatment --  -KR room air  -KR     Intra SpO2 (%) --  -KR 95  -KR     O2 Delivery Intra Treatment --  -KR room air  -KR     Post SpO2 (%) --  -KR 97  -KR     O2 Delivery Post Treatment --  -KR room air  -KR     Pre Patient Position --  -KR Sitting  -KR     Intra Patient Position --  -KR Standing  -KR     Post Patient Position --  -KR Sitting  -KR     Recorded by [KR] Kiarra Holcomb, PT [KR] Kiarra Holcomb, PT     Pain Assessment    Pain Assessment --  -KR 0-10  -KR     Pain Score --  -KR 0  -KR     Post Pain Score --  -KR 0  -KR     Recorded by [KR] Kiarra Holcomb, PT [KR] Kiarra Holcomb, PT     Cognitive Assessment/Intervention    Current Cognitive/Communication Assessment --  -KR functional  -KR     Orientation Status --  -KR oriented x 4  -KR     Follows Commands/Answers Questions --  -KR able to follow single-step instructions;100% of the time  -KR     Personal Safety --  -KR WNL/WFL  -KR     Personal Safety Interventions --  -KR fall prevention program maintained;gait belt;nonskid shoes/slippers when out of bed  -KR     Recorded by [KR] Kiarra Holcomb, PT [KR] Kiarra Holcomb, PT     Bed Mobility, Assessment/Treatment    Bed Mob, Supine to Sit, [place]  not tested  -KR     Bed Mob, Sit to Supine, [place]  not tested  -KR     Bed Mobility, Comment  UIC  -KR     Recorded by  [KR] Kiarra Holcomb, PT     Transfer Assessment/Treatment    Transfers, Sit-Stand [place]  contact guard assist;verbal cues required  -KR     Transfers, Stand-Sit [place]  contact guard assist;verbal cues required  -KR     Transfers, Sit-Stand-Sit, Assist Device  rolling walker  -KR     Transfer, Safety Issues  step length decreased  -KR     Transfer, Comment  Pt given VC's for correct hand placement.   -KR     Recorded by  [KR] Kiarra Holcomb PT     Gait Assessment/Treatment    Gait, [place] Level  contact guard assist;1 person + 1 person to manage equipment;verbal cues required  -KR      Gait, Assistive Device  rolling walker  -KR     Gait, Distance (Feet)  300  -KR     Gait, Gait Pattern Analysis  swing-through gait  -KR     Gait, Gait Deviations  anna decreased;forward flexed posture;step length decreased  -KR     Gait, Safety Issues  step length decreased  -KR     Gait, Impairments  strength decreased  -KR     Gait, Comment  Pt demonstrated slow gait speed with decreased step length, but had no periods of decreased balance. O2 sats remained in mid 90s; elevated HR noted with increased mobility.   -KR     Recorded by  [UZIEL] Kiarra Holcomb PT     Motor Skills/Interventions    Additional Documentation  Balance Skills Training (Group)  -KR     Recorded by  [UZIEL] Kiarra Holcomb PT     Balance Skills Training    Sitting-Level of Assistance  Distant supervision  -KR     Sitting-Balance Support  Feet supported  -KR     Standing-Level of Assistance  Contact guard  -KR     Static Standing Balance Support  assistive device  -KR     Gait Balance-Level of Assistance  Contact guard  -KR     Gait Balance Support  assistive device  -KR     Recorded by  [UZIEL] Kiarra Holcomb PT     Therapy Exercises    Bilateral Lower Extremities  AROM:;10 reps;sitting;ankle pumps/circles;hip flexion;LAQ  -KR     Recorded by  [UZIEL] Kiarra Holcomb PT     Positioning and Restraints    Pre-Treatment Position  sitting in chair/recliner  -KR     Post Treatment Position  chair  -KR     In Chair  notified nsg;reclined;call light within reach;encouraged to call for assist;waffle cushion;legs elevated  -KR     Recorded by  [UZIEL] Kiarra Holcomb PT       User Key  (r) = Recorded By, (t) = Taken By, (c) = Cosigned By    Initials Name Effective Dates    LS Sherine Cohen, PT 06/19/15 -     LM Luna Dos Santos, PT 06/15/16 -     KR Kiarra Holcomb, PT 09/25/17 -                 IP PT Goals       02/04/18 0910 02/03/18 1637 02/02/18 1526    Bed Mobility PT LTG    Bed Mobility PT LTG, Outcome goal met  -LS goal ongoing  -LS goal ongoing  -LM     Transfer Training PT LTG    Transfer Training PT LTG, Outcome  goal met  -LS goal ongoing  -LM    Gait Training PT LTG    Gait Training Goal PT LTG, Outcome goal met  -LS goal ongoing  -LS goal ongoing  -LM    Stair Training PT LTG    Stair Training Goal PT LTG, Outcome goal ongoing  -LS goal ongoing  -LS goal ongoing  -LM      02/01/18 1623 01/31/18 1349       Bed Mobility PT LTG    Bed Mobility PT LTG, Date Established  01/31/18  -LSA     Bed Mobility PT LTG, Time to Achieve  2 wks  -LSA     Bed Mobility PT LTG, Activity Type  supine to sit/sit to supine  -LSA     Bed Mobility PT LTG, [place] Level  independent  -LSA     Bed Mobility PT LTG, Outcome goal ongoing  -KR      Transfer Training PT LTG    Transfer Training PT LTG, Date Established  01/31/18  -LSA     Transfer Training PT LTG, Time to Achieve  2 wks  -LSA     Transfer Training PT LTG, Activity Type  sit to stand/stand to sit  -LSA     Transfer Training PT LTG, [place] Level  conditional independence  -LSA     Transfer Training PT LTG, Outcome goal ongoing  -KR      Gait Training PT LTG    Gait Training Goal PT LTG, Date Established  01/31/18  -LSA     Gait Training Goal PT LTG, Time to Achieve  2 wks  -LSA     Gait Training Goal PT LTG, [place] Level  conditional independence  -LSA     Gait Training Goal PT LTG, Assist Device  walker, rolling  -LSA     Gait Training Goal PT LTG, Distance to Achieve  400  -LSA     Gait Training Goal PT LTG, Outcome goal ongoing  -KR      Stair Training PT LTG    Stair Training Goal PT LTG, Date Established  01/31/18  -LSA     Stair Training Goal PT LTG, Time to Achieve  2 wks  -LSA     Stair Training Goal PT LTG, Number of Steps  2  -LSA     Stair Training Goal PT LTG, [place] Level  supervision required  -LSA     Stair Training Goal PT LTG, Assist Device  1 handrail  -LSA     Stair Training Goal PT LTG, Outcome goal ongoing  -KR        User Key  (r) = Recorded By, (t) = Taken By, (c) = Cosigned By  "   Initials Name Provider Type     Sherine Greene Noel, PT Physical Therapist    LSLIDIA Atkinson, PT Physical Therapist    SHILPA Dos Santos, PT Physical Therapist    UZIEL Holcomb, PT Physical Therapist          Physical Therapy Education     Title: PT OT SLP Therapies (Active)     Topic: Physical Therapy (Active)     Point: Mobility training (Done)    Learning Progress Summary    Learner Readiness Method Response Comment Documented by Status   Patient Acceptance E VU,NR   02/02/18 1607 Done    Acceptance E NR  KR 02/01/18 1623 Active    Acceptance E,D NR  LS 01/31/18 1349 Active               Point: Home exercise program (Done)    Learning Progress Summary    Learner Readiness Method Response Comment Documented by Status   Patient Acceptance E,H VU,DU resisted R ankle df; resissted R ankle eversion w/red theraband progressing to green (both issued) X 20 reps, 2-3X/day and to work on \"writing\" ABCs w/R foot. LS1 02/04/18 0915 Done    Acceptance E VU Encouraged pt do work on aps BLE for circulation. Discussed that pt would benefit from theraband for R ankle dorsiflexor strengthening as well as a trial w/AFO R foot for ambulation. LS1 02/03/18 1635 Done    Acceptance E NR  KR 02/01/18 1623 Active    Acceptance E,D NR   01/31/18 1349 Active               Point: Body mechanics (Active)    Learning Progress Summary    Learner Readiness Method Response Comment Documented by Status   Patient Acceptance E NR  KR 02/01/18 1623 Active    Acceptance E,D NR   01/31/18 1349 Active               Point: Precautions (Done)    Learning Progress Summary    Learner Readiness Method Response Comment Documented by Status   Patient Acceptance E VU,NR Educated pt on use of AFO including donning and monitoring skin for areas of redness and to discontinue AFO wear if redness lasts > 20 minutes. LS1 02/04/18 0916 Done    Acceptance E SCHUYLER,NR   02/02/18 1607 Done    Acceptance E NR  KR 02/01/18 1623 Active    Acceptance E,D NR  " LS 01/31/18 1349 Active                      User Key     Initials Effective Dates Name Provider Type Discipline    LS1 06/19/15 -  Sherine Cohen, PT Physical Therapist PT    LS 06/19/15 -  Joycelyn Atkinson, PT Physical Therapist PT    LM 06/15/16 -  Luna Dos Santos, PT Physical Therapist PT    KR 09/25/17 -  Kiarra Holcomb, PT Physical Therapist PT                    PT Recommendation and Plan  Anticipated Equipment Needs At Discharge: front wheeled walker  Anticipated Discharge Disposition: home with assist  PT Frequency: daily  Plan of Care Review  Plan Of Care Reviewed With: patient  Progress: improving  Outcome Summary/Follow up Plan: Independently walking w/RW. Gait improved w/R AFO which was issued to pt.          Outcome Measures       02/04/18 0800 02/03/18 1545 02/02/18 1526    How much help from another person do you currently need...    Turning from your back to your side while in flat bed without using bedrails? 4  -LS 4  -LS 4  -LM    Moving from lying on back to sitting on the side of a flat bed without bedrails? 4  -LS 4  -LS 4  -LM    Moving to and from a bed to a chair (including a wheelchair)? 4  -LS 4  -LS 3  -LM    Standing up from a chair using your arms (e.g., wheelchair, bedside chair)? 4  -LS 4  -LS 3  -LM    Climbing 3-5 steps with a railing? 3  -LS 3  -LS 3  -LM    To walk in hospital room? 4  -LS 3  -LS 3  -LM    AM-PAC 6 Clicks Score 23  -LS 22  -LS 20  -LM    Functional Assessment    Outcome Measure Options AM-PAC 6 Clicks Basic Mobility (PT)  -LS AM-PAC 6 Clicks Basic Mobility (PT)  -LS AM-PAC 6 Clicks Basic Mobility (PT)  -LM      02/01/18 1539          How much help from another person do you currently need...    Turning from your back to your side while in flat bed without using bedrails? 3  -KR      Moving from lying on back to sitting on the side of a flat bed without bedrails? 3  -KR      Moving to and from a bed to a chair (including a wheelchair)? 3  -KR      Standing up from a  chair using your arms (e.g., wheelchair, bedside chair)? 3  -KR      Climbing 3-5 steps with a railing? 3  -KR      To walk in hospital room? 3  -KR      AM-PAC 6 Clicks Score 18  -KR      Functional Assessment    Outcome Measure Options AM-PAC 6 Clicks Basic Mobility (PT)  -KR        User Key  (r) = Recorded By, (t) = Taken By, (c) = Cosigned By    Initials Name Provider Type    LS Sherine Cohen, PT Physical Therapist    SHILPA Dos Santos, PT Physical Therapist    UZIEL Holcomb, PT Physical Therapist           Time Calculation:         PT Charges       02/04/18 0917          Time Calculation    Start Time 0800   tcc: 40min  -LS      PT Received On 02/04/18  -LS      PT Goal Re-Cert Due Date 02/10/18  -        User Key  (r) = Recorded By, (t) = Taken By, (c) = Cosigned By    Initials Name Provider Type    DIMITRIOS Cohen, PT Physical Therapist          Therapy Charges for Today     Code Description Service Date Service Provider Modifiers Qty    13328216077 HC PT THER PROC EA 15 MIN 2/3/2018 Sherine Cohen, PT GP 1    56051615738 HC PT THER PROC EA 15 MIN 2/4/2018 Sherine Cohen, PT GP 2    13200862113 HC GAIT TRAINING EA 15 MIN 2/4/2018 Sherine Cohen, PT GP 1          PT G-Codes  Outcome Measure Options: AM-PAC 6 Clicks Basic Mobility (PT)    Sherine Cohen, PT  2/4/2018

## 2018-02-04 NOTE — PLAN OF CARE
Problem: Patient Care Overview (Adult)  Goal: Plan of Care Review  Outcome: Ongoing (interventions implemented as appropriate)   02/04/18 0501   Coping/Psychosocial Response Interventions   Plan Of Care Reviewed With patient   Patient Care Overview   Progress improving   Outcome Evaluation   Outcome Summary/Follow up Plan VSS. Afib on the monitor, heart rate 's). Pt uses walker to go to the bathroom. NO c/o pain. Pt rested well..

## 2018-02-05 VITALS
HEART RATE: 89 BPM | TEMPERATURE: 97.7 F | BODY MASS INDEX: 33.73 KG/M2 | WEIGHT: 197.6 LBS | HEIGHT: 64 IN | DIASTOLIC BLOOD PRESSURE: 65 MMHG | RESPIRATION RATE: 16 BRPM | SYSTOLIC BLOOD PRESSURE: 115 MMHG | OXYGEN SATURATION: 96 %

## 2018-02-05 LAB
INR PPP: 2.52
PROTHROMBIN TIME: 28.3 SECONDS (ref 9.6–11.5)

## 2018-02-05 PROCEDURE — 85610 PROTHROMBIN TIME: CPT

## 2018-02-05 PROCEDURE — 25010000002 ENOXAPARIN PER 10 MG

## 2018-02-05 PROCEDURE — 99239 HOSP IP/OBS DSCHRG MGMT >30: CPT | Performed by: HOSPITALIST

## 2018-02-05 RX ORDER — AMOXICILLIN AND CLAVULANATE POTASSIUM 875; 125 MG/1; MG/1
1 TABLET, FILM COATED ORAL EVERY 12 HOURS SCHEDULED
Qty: 5 TABLET | Refills: 0 | Status: SHIPPED | OUTPATIENT
Start: 2018-02-05 | End: 2018-02-08

## 2018-02-05 RX ORDER — ALBUTEROL SULFATE 90 UG/1
2 AEROSOL, METERED RESPIRATORY (INHALATION) EVERY 6 HOURS PRN
Qty: 9 G | Refills: 0 | Status: SHIPPED | OUTPATIENT
Start: 2018-02-05 | End: 2019-05-02

## 2018-02-05 RX ORDER — AMIODARONE HYDROCHLORIDE 200 MG/1
200 TABLET ORAL DAILY
Qty: 30 TABLET | Refills: 12 | Status: SHIPPED | OUTPATIENT
Start: 2018-02-05 | End: 2018-05-25

## 2018-02-05 RX ADMIN — POTASSIUM CHLORIDE 20 MEQ: 750 CAPSULE, EXTENDED RELEASE ORAL at 08:28

## 2018-02-05 RX ADMIN — FLUTICASONE PROPIONATE 2 SPRAY: 50 SPRAY, METERED NASAL at 08:29

## 2018-02-05 RX ADMIN — AMIODARONE HYDROCHLORIDE 200 MG: 200 TABLET ORAL at 05:42

## 2018-02-05 RX ADMIN — AMOXICILLIN AND CLAVULANATE POTASSIUM 1 TABLET: 875; 125 TABLET, FILM COATED ORAL at 08:29

## 2018-02-05 RX ADMIN — ENOXAPARIN SODIUM 90 MG: 100 INJECTION SUBCUTANEOUS at 08:28

## 2018-02-05 RX ADMIN — AMIODARONE HYDROCHLORIDE 200 MG: 200 TABLET ORAL at 14:43

## 2018-02-05 RX ADMIN — METOPROLOL TARTRATE 12.5 MG: 25 TABLET, FILM COATED ORAL at 08:29

## 2018-02-05 RX ADMIN — FAMOTIDINE 20 MG: 20 TABLET, FILM COATED ORAL at 08:28

## 2018-02-05 RX ADMIN — NYSTATIN: 100000 POWDER TOPICAL at 08:28

## 2018-02-05 NOTE — PLAN OF CARE
Problem: Patient Care Overview (Adult)  Goal: Plan of Care Review  Outcome: Ongoing (interventions implemented as appropriate)   02/05/18 1327   Coping/Psychosocial Response Interventions   Plan Of Care Reviewed With patient   Patient Care Overview   Progress no change   Outcome Evaluation   Outcome Summary/Follow up Plan Ready for d/c. VSS. Rate controled a fib on monitor. RA

## 2018-02-05 NOTE — PLAN OF CARE
Problem: Patient Care Overview (Adult)  Goal: Plan of Care Review  Outcome: Ongoing (interventions implemented as appropriate)   02/05/18 0417   Coping/Psychosocial Response Interventions   Plan Of Care Reviewed With patient   Patient Care Overview   Progress improving   Outcome Evaluation   Outcome Summary/Follow up Plan Pt asleep most of HS. Denies discomfort. Afib rate of 60's-80's. RA VSS       Problem: Arrhythmia/Dysrhythmia (Symptomatic) (Adult)  Goal: Signs and Symptoms of Listed Potential Problems Will be Absent or Manageable (Arrhythmia/Dysrhythmia)  Outcome: Ongoing (interventions implemented as appropriate)

## 2018-02-05 NOTE — PROGRESS NOTES
"Pharmacy Consult  -  Warfarin    Lavern Youngblood is a  52 y.o. female   Height - 162.6 cm (64\")  Weight - 88 kg89.6 kg (197 lb 9.6 oz)    Consulting Provider: - Britt Cantu MD  Indication: - Mechanical Mitral & Aortic Valve, AFib  Goal INR: - 2.5-3.5  Home Regimen:   - Warfarin 7 mg daily     Bridge Therapy: Yes   and enoxapain 90 mg SQ q12h     Drug-Drug Interactions with current regimen:   Amiodarone - increase INR - major drug drug interaction (started 1/30/18)              Augmentin - increase risk of bleeding              Enoxaparin - increase risk of bleeding    Warfarin Dosing During Admission:    Date  1/30 1/31 2/1 2/2 2/3 2/4 2/5     INR  1.28 1.42 1.75 1.86 2.05 2.24 2.52     Dose  6 mg 6 mg 7 mg 7 mg 7 mg  7mg 7mg         Education Provided:  Follow-up during admission    Labs:  Results from last 7 days   Lab Units 02/05/18  0600 02/04/18  0530 02/03/18  0636 02/02/18  0606 02/01/18 0452 01/31/18 0522 01/30/18 2009 01/30/18  0748   INR  2.52 2.24 2.05 1.86 1.75 1.42 --  1.28   HEMOGLOBIN g/dL --  --  --  13.1 13.5 13.1 13.7 15.0   HEMATOCRIT % --  --  --  41.3 41.7 40.1 42.1 44.4*   PLATELETS 10*3/mm3 --  --  --  283 267 290 301 329     Results from last 7 days   Lab Units 02/02/18  0606 02/01/18  0452 01/31/18 0522 01/30/18  0748   SODIUM mmol/L 133 135 135 134   POTASSIUM mmol/L 4.2 4.0 4.1 3.8   CHLORIDE mmol/L 102 106 106 102   CO2 mmol/L 21.0 24.0 22.0 21.0   BUN mg/dL 14 18 17 17   CREATININE mg/dL 0.70 0.60 0.60 0.80   CALCIUM mg/dL 9.6 10.4 10.2 10.7*   BILIRUBIN mg/dL --  --  --  1.0   ALK PHOS U/L --  --  --  75   ALT (SGPT) U/L --  --  --  20   AST (SGOT) U/L --  --  --  32   GLUCOSE mg/dL 80 98 124* 151*     Current dietary intake: 100% meals    Diet Order   Procedures   • Diet Regular; Cardiac     Assessment/Plan:     2/5 INR - 2.52, increased from 2.24    Goal INR 2.5-3.5.     -continue warfarin 7mg daily  -bridge with enoxaparin 90 mg (1 mg/kg) SQ q12h until INR is therapeutic x 2 " consecutive reads ( > 24 hrs).  -Amiodarone therapy started 1/30.   -Anticipated onset of drug-drug interaction between amiodarone and warfarin is 3-7 days from initiation of therapy (expect 50% dose reduction compared to admission regimen).   -Pharmacy will continue to monitor and adjust warfarin dose as necessary based on labs, drug interactions, diet, and clinical status.     Thank you,  Rajesh Kahn [facility]  2/5/2018  7:52 AM

## 2018-02-05 NOTE — DISCHARGE SUMMARY
[facility] Medicine Services  DISCHARGE SUMMARY    Patient Name: Lavern Youngblood  : 1965  MRN: [id number]    Date of Admission: 2018  Date of Discharge:  2018  Primary Care Physician: No Known Provider    Consults     Date and Time Order Name Status Description    2018 0726 Inpatient Consult to Cardiology Completed         Hospital Course     Presenting Problem:   CHF (congestive heart failure) [I50.9]  Pneumonia [J18.9]    Active Hospital Problems (** Indicates Principal Problem)    Diagnosis Date Noted   • **A-fib with RVR [I48.91] 2018   • VHD (valvular heart disease) s/p AVR, MVR  [I38] 2018   • Acute on chronic diastolic congestive heart failure [I50.33] 2018   • Former smoker, quit  [Z87.891] 2018   • Anticoagulated on Coumadin [Z51.81, Z79.01] 2018   • H/O: CVA with residual memory loss, mild right sided weakness,  [Z86.73] 2018   • Subtherapeutic international normalized ratio (INR) [R79.1] 2018   • Improved RUL Infiltrate (R/O CAP, atelectasis) [R91.8] 2018      Resolved Hospital Problems    Diagnosis Date Noted Date Resolved   No resolved problems to display.          Hospital Course:  Lavern Youngblood is a 52 y.o. 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  She presented to OSH ED with 2-3 days of dry cough, congestion and increasing HERNANDEZ.  Patient also noted associated increased BLE edema despite taking her home Lasix dose.  At OSH ED found to have RUL pneumonia and was in Afib with RVR.  Her BP's were low but improved with IVF's and correction of RVR to promote better effective stroke volumes.  Transferred to [facility] for evaluation and treatment as Dr. Wilson is her primary Cardiologist. On the night after admission, pt became hemodynamically unstable, HR 150s, SBP 60s, and RR 40s. She  was transferred to the ICU on 1/30 and returned to regular floor on 2/1 and IM assuming care on 2/2. Patient remained relatively stable and on breathing without any difficulties on RA, but was kept in the hospital because INR was subtherapeutic. She received Lovenox bridge until INR was >2.5. Her cardiac medications were adjusted by her primary cardiologist, Dr Wilson and his PA. She was still in Afib, but rate was well controlled at time of discharge. She was discharged on Augmentin for sinusitis, not sure if pt actually has pneumonia. CXR on 1/30 showed RUL opacity, but resolved on repeat CXR only 13 hrs later. Leukocytosis improving. Pt is afebrile/nontoxic. She will need to F/U with PCP and Cardiology. PCP to follow and manage Coumadin therapy after discharge.        Day of Discharge     HPI:   No acute event O/N. Doing well, denies any chest pain, SOA, or palpitations. No fever or chills.    Review of Systems  Otherwise ROS is negative except as mentioned in the HPI.    Vital Signs:   Temp:  [97.7 °F (36.5 °C)-97.9 °F (36.6 °C)] 97.7 °F (36.5 °C)  Heart Rate:  [69-90] 90  Resp:  [16-18] 16  BP: ()/(65-86) 115/65     Physical Exam:  General Assessment: No acute cardiopulmonary distress.       HEENT: NCAT, PERRL, MM moist      Neck: Supple      CVS: Irreg rhythm, reg rate, S1S2 normal, no murmurs      Resp: Good air flow, + crackles bilat bases, mild scattered wheezing, resp non-labored (unchanged over last few days), on RA      Abd: soft, NT, ND, normal BS, no guarding or peritoneal signs      Ext: No edema, both calves are symmetric and NTTP      Neuro: No facial asymmetry, speech clear      Skin: W/D/I. No rash.      Psych: Affect is appropriate          Pertinent  and/or Most Recent Results       Results from last 7 days  Lab Units 02/02/18  0606 02/01/18  0452 01/31/18  0522 01/30/18 2009 01/30/18  0748   WBC 10*3/mm3 7.92 19.14* 28.32* 26.54* 9.50   HEMOGLOBIN g/dL 13.1 13.5 13.1 13.7 15.0    HEMATOCRIT % 41.3 41.7 40.1 42.1 44.4*   PLATELETS 10*3/mm3 283 267 290 301 329   SODIUM mmol/L 133 135 135  --  134   POTASSIUM mmol/L 4.2 4.0 4.1  --  3.8   CHLORIDE mmol/L 102 106 106  --  102   CO2 mmol/L 21.0 24.0 22.0  --  21.0   BUN mg/dL 14 18 17  --  17   CREATININE mg/dL 0.70 0.60 0.60  --  0.80   GLUCOSE mg/dL 80 98 124*  --  151*   CALCIUM mg/dL 9.6 10.4 10.2  --  10.7*       Results from last 7 days  Lab Units 02/05/18  0600 02/04/18  0530 02/03/18  0636 02/02/18 0606 02/01/18 0452 01/31/18 0522 01/30/18  0748   BILIRUBIN mg/dL  --   --   --   --   --   --  1.0   ALK PHOS U/L  --   --   --   --   --   --  75   ALT (SGPT) U/L  --   --   --   --   --   --  20   AST (SGOT) U/L  --   --   --   --   --   --  32   PROTIME Seconds 28.3* 25.0* 22.9* 20.7* 19.4* 15.6* 14.1*   INR  2.52 2.24 2.05 1.86 1.75 1.42 1.28           Invalid input(s): TG, LDLREALC    Results from last 7 days  Lab Units 01/31/18 0522 01/30/18 2009 01/30/18  0748   TSH mIU/mL  --   --  1.254   HEMOGLOBIN A1C %  --   --  5.50   BNP pg/mL  --   --  707.0*   TROPONIN I ng/mL 1.480* 0.054* 0.043*     Brief Urine Lab Results  (Last result in the past 365 days)      Color   Clarity   Blood   Leuk Est   Nitrite   Protein   CREAT   Urine HCG        01/30/18 1452 Yellow Cloudy(A) Trace(A) Negative Negative Negative               Microbiology Results Abnormal     Procedure Component Value - Date/Time    Blood Culture - Blood, [[id number]]  (Normal) Collected:  01/30/18 0734    Lab Status:  Final result Specimen:  Blood from Arm, Left Updated:  02/04/18 0816     Blood Culture No growth at 5 days    Blood Culture - Blood, [[id number]]  (Normal) Collected:  01/30/18 0748    Lab Status:  Final result Specimen:  Blood from Hand, Right Updated:  02/04/18 0816     Blood Culture No growth at 5 days    Urine Culture - Urine, Urine, Clean Catch [[id number]]  (Normal) Collected:  01/30/18 1452    Lab Status:  Final result Specimen:  Urine from Urine, Clean  Catch Updated:  02/01/18 0841     Urine Culture No growth at 2 days          Imaging Results (all)     Procedure Component Value Units Date/Time    XR Chest 1 View [[id number]] Collected:  01/30/18 0926     Updated:  01/30/18 2250    Narrative:       EXAMINATION: XR CHEST 1 VW-01/30/2018:      INDICATION: SOA, per OSH, ? RUL pneumonia.      COMPARISON: 12/03/2011.     FINDINGS: Sternotomy wires and cardiac valve prosthesis are again noted.  The heart is upper limits of normal size. Mild patchy interstitial  changes in the right upper lobe are suspicious for developing pneumonia.  No effusion or pneumothorax is seen.           Impression:       Suspected right upper lobe pneumonia.     D:  01/30/2018  E:  01/30/2018     This report was finalized on 1/30/2018 10:48 PM by DR. Fabiano Lora MD.       XR Chest 1 View [[id number]] Collected:  01/31/18 0949     Updated:  01/31/18 1003    Narrative:       EXAMINATION: XR CHEST 1 VW-01/30/2018:      INDICATION: Dyspnea.      COMPARISON: NONE.     FINDINGS:   1. The heart size is upper limits of normal for this portable technique.  The heart is compensated.  2. Previous valve replacement has been performed with median sternotomy.  3. There is no edema, no free fluid and no active disease.           Impression:       The right upper lobe airspace opacity seen earlier today  appears to have resolved. There is a cardiac lead over some of this  area. Otherwise, no residual or acute active disease is identified at  this time.     D:  01/31/2018  E:  01/31/2018     This report was finalized on 1/31/2018 10:01 AM by Dr. Cedrick Conde MD.                       Results for orders placed during the hospital encounter of 01/30/18   Adult Transthoracic Echo Complete W/ Cont if Necessary Per Protocol    Narrative · Left ventricular systolic function is normal.  · Mild tricuspid valve regurgitation is present.            Discharge Details      Lavern Youngblood   Home Medication Instructions  ALEKSANDAR:[id number]    Printed on:02/05/18 1445   Medication Information                      albuterol (PROVENTIL HFA;VENTOLIN HFA) 108 (90 Base) MCG/ACT inhaler  Inhale 2 puffs Every 6 (Six) Hours As Needed for Wheezing.             amiodarone (PACERONE) 200 MG tablet  Take 1 tablet by mouth Daily.             amoxicillin-clavulanate (AUGMENTIN) 875-125 MG per tablet  Take 1 tablet by mouth Every 12 (Twelve) Hours for 5 doses. Indications: Upper Respiratory Tract Infection             atorvastatin (LIPITOR) 10 MG tablet  Take 10 mg by mouth Daily.             metoprolol tartrate (LOPRESSOR) 25 MG tablet  Take 0.5 tablets by mouth Every 12 (Twelve) Hours.             raNITIdine (ZANTAC) 300 MG tablet  Take 300 mg by mouth 2 (Two) Times a Day.             warfarin (COUMADIN) 1 MG tablet  Take 2 mg by mouth Daily. Take 1 mg x 2 with warfarin 5 mg to equal warfarin 7 mg daily             warfarin (COUMADIN) 5 MG tablet  Take 5 mg by mouth Daily. Take with 1 mg x 2 to equal warfarin 7 mg daily                   Discharge Disposition:  Home or Self Care    Discharge Diet:      Discharge Activity:       Special Instructions:      Additional Instructions for the Follow-ups that You Need to Schedule     Discharge Follow-up with PCP    As directed    Follow Up Details:  1-2 wks           Discharge Follow-up with Specialty: Cardiology; 6 Weeks    As directed    Specialty:  Cardiology    Follow Up:  6 Weeks    Follow Up Details:  Dr Wilson           Discharge Follow-up with Specified Provider: Dr. Wilson; 6 Weeks    As directed    To:  Dr. Wilson    Follow Up:  6 Weeks                     Time Spent on Discharge:  50 minutes    Manuel Angulo MD  02/05/18  2:41 PM

## 2018-02-05 NOTE — PROGRESS NOTES
[facility]       LOS: 6 days   Patient Care Team:  No Known Provider as PCP - General        Subjective       Patient Denies:  Cp, sob, palpitations.      Vital Signs  Temp:  [97.7 °F (36.5 °C)-97.9 °F (36.6 °C)] 97.8 °F (36.6 °C)  Heart Rate:  [69-84] 69  Resp:  [16-18] 16  BP: ()/(75-86) 107/86    Intake/Output Summary (Last 24 hours) at 02/05/18 0917  Last data filed at 02/04/18 1040   Gross per 24 hour   Intake                0 ml   Output              225 ml   Net             -225 ml          Physical Exam:     General Appearance:    Alert, cooperative, in no acute distress       Neck:   No adenopathy, supple, trachea midline, no thyromegaly, no JVD       Lungs:     Mild anterior wheezes to auscultation,respirations regular, even and unlabored    Heart:    Irregular rhythm and normal rate, normal S1 and S2, no            murmur, no gallop, no rub, + click   Chest Wall:    No abnormalities observed   Abdomen:     Normal bowel sounds, no masses, no organomegaly, soft        non-tender, non-distended       Extremities:   Moves all extremities well, no edema, no cyanosis, no             redness   Pulses:   Pulses palpable and equal bilaterally     Results Review:     I reviewed the patient's new clinical results.      WBC No results found for: WBC         HGB No results found for: HGB        HCT No results found for: HCT         Platlets No results found for: LABPLAT  Sodium  No results found for: NA  Potassium  No results found for: K  Chloride  No results found for: CL  BicarbonateNo results found for: PLASMABICARB    BUN No results found for: BUN   Creatinine No results found for: CREATININE   Calcium No results found for: CALCIUM   Mag No results found for: MG        PT/INR:    Protime   Date Value Ref Range Status   02/05/2018 28.3 (H) 9.6 - 11.5 Seconds Final   02/04/2018 25.0 (H) 9.6 - 11.5 Seconds Final   02/03/2018 22.9 (H) 9.6 - 11.5  Seconds Final   /  INR   Date Value Ref Range Status   02/05/2018 2.52  Final   02/04/2018 2.24  Final   02/03/2018 2.05  Final     Troponin I     Lab Results   Component Value Date    TROPONINI 1.480 (C) 01/31/2018         amiodarone 200 mg Oral Q8H   Followed by      [START ON 2/7/2018] amiodarone 200 mg Oral Q12H   Followed by      [START ON 2/22/2018] amiodarone 200 mg Oral Daily   amoxicillin-clavulanate 1 tablet Oral Q12H   atorvastatin 10 mg Oral Nightly   enoxaparin 90 mg Subcutaneous Q12H   famotidine 20 mg Oral Daily   fluticasone 2 spray Each Nare Daily   metoprolol tartrate 12.5 mg Oral Q12H   nystatin  Topical Q12H   Pharmacy to Dose enoxaparin (LOVENOX)  Does not apply Daily   Pharmacy to dose warfarin  Does not apply Daily   potassium chloride 20 mEq Oral BID   sennosides-docusate sodium 2 tablet Oral Nightly   warfarin 7 mg Oral Daily          Assessment/Plan     Patient Active Problem List   Diagnosis Code   • A-fib with RVR I48.91   • VHD (valvular heart disease) s/p AVR, MVR 2011 I38   • Acute on chronic diastolic congestive heart failure I50.33   • Former smoker, quit 2010 Z87.891   • Anticoagulated on Coumadin Z51.81, Z79.01   • Rheumatic valvular disease as reason for MVR and AVR I09.1   • H/O: CVA with residual memory loss, mild right sided weakness, 2010 Z86.73   • Subtherapeutic international normalized ratio (INR) R79.1   • Improved RUL Infiltrate (R/O CAP, atelectasis) R91.8     CV stable  Cont rate control and anticoagulation  F/u 6 weeks    DENNIS Tubbs  02/05/18  9:17 AM

## 2018-02-06 ENCOUNTER — EPISODE CHANGES (OUTPATIENT)
Dept: CASE MANAGEMENT | Facility: OTHER | Age: 53
End: 2018-02-06

## 2018-02-19 ENCOUNTER — HOSPITAL ENCOUNTER (OUTPATIENT)
Dept: CARDIOLOGY | Facility: HOSPITAL | Age: 53
Discharge: HOME | End: 2018-02-19
Admitting: NURSE PRACTITIONER

## 2018-02-19 ENCOUNTER — EPISODE CHANGES (OUTPATIENT)
Dept: CASE MANAGEMENT | Facility: OTHER | Age: 53
End: 2018-02-19

## 2018-02-19 ENCOUNTER — OFFICE VISIT (OUTPATIENT)
Dept: CARDIOLOGY | Facility: HOSPITAL | Age: 53
End: 2018-02-19

## 2018-02-19 VITALS
SYSTOLIC BLOOD PRESSURE: 131 MMHG | WEIGHT: 207.2 LBS | DIASTOLIC BLOOD PRESSURE: 97 MMHG | HEIGHT: 64 IN | RESPIRATION RATE: 18 BRPM | HEART RATE: 61 BPM | OXYGEN SATURATION: 99 % | TEMPERATURE: 97.9 F | BODY MASS INDEX: 35.37 KG/M2

## 2018-02-19 DIAGNOSIS — I50.33 ACUTE ON CHRONIC DIASTOLIC CONGESTIVE HEART FAILURE (HCC): ICD-10-CM

## 2018-02-19 DIAGNOSIS — I48.19 PERSISTENT ATRIAL FIBRILLATION (HCC): Primary | ICD-10-CM

## 2018-02-19 DIAGNOSIS — I48.91 ATRIAL FIBRILLATION, UNSPECIFIED TYPE (HCC): ICD-10-CM

## 2018-02-19 DIAGNOSIS — Z79.01 ANTICOAGULATED ON COUMADIN: ICD-10-CM

## 2018-02-19 DIAGNOSIS — I38 VHD (VALVULAR HEART DISEASE): ICD-10-CM

## 2018-02-19 PROCEDURE — 99214 OFFICE O/P EST MOD 30 MIN: CPT | Performed by: NURSE PRACTITIONER

## 2018-02-19 PROCEDURE — 93005 ELECTROCARDIOGRAM TRACING: CPT | Performed by: NURSE PRACTITIONER

## 2018-02-19 PROCEDURE — 93010 ELECTROCARDIOGRAM REPORT: CPT | Performed by: INTERNAL MEDICINE

## 2018-02-19 RX ORDER — POTASSIUM CHLORIDE 20 MEQ/1
10 TABLET, EXTENDED RELEASE ORAL DAILY
Qty: 15 TABLET | Refills: 3
Start: 2018-02-19

## 2018-02-19 RX ORDER — FERROUS SULFATE 325(65) MG
325 TABLET ORAL DAILY
COMMUNITY

## 2018-02-19 RX ORDER — FUROSEMIDE 40 MG/1
20 TABLET ORAL DAILY
Qty: 15 TABLET | Refills: 3
Start: 2018-02-19

## 2018-02-19 NOTE — PROGRESS NOTES
[facility]  Heart and Valve Center      Encounter Date:02/19/2018     Lavern Youngblood  [address]  [place] [address]  [phone]    1965    Cruz Rodriguez MD    Lavern Youngblood is a 52 y.o. female.      Subjective:     Chief Complaint:  Atrial Fibrillation (s/p Afib RVR hospital visit and chronic DHF)       HPI     Patient admitted to [facility] 1/30/18.-year-old female with history of PAF, rheumatic valvular heart disease status post previous mechanical aortic and mitral valve replacement chronically anticoagulated with Coumadin.  He diagnosed with pneumonia.  Data be in A. fib with RVR.  Patient of Dr. Misael Wilson.  She is discharged in A. fib with controlled rates on amio.  Presents to the heart and valve Center today for evaluation post discharge. During hospital stay Tegan was d/c'd    Pt reports intermittent worsening dyspnea and edema.  She has had a 10 lb weight gain in 2 weeks. Denies CP, pressure, palpitations, dizziness, syncope, orthopnea/PND.  Reports seeing PCP with repeat INR.    Patient Active Problem List    Diagnosis   • A-fib with RVR [I48.91]   • VHD (valvular heart disease) s/p AVR, MVR 2011 [I38]   • Acute on chronic diastolic congestive heart failure [I50.33]   • Former smoker, quit 2010 [Z87.891]   • Anticoagulated on Coumadin [Z51.81, Z79.01]   • Rheumatic valvular disease as reason for MVR and AVR [I09.1]   • H/O: CVA with residual memory loss, mild right sided weakness, 2010 [Z86.73]   • Subtherapeutic international normalized ratio (INR) [R79.1]   • Improved RUL Infiltrate (R/O CAP, atelectasis) [R91.8]         Past Surgical History:   Procedure Laterality Date   • AORTIC VALVE REPAIR/REPLACEMENT  11/30/2011   • MITRAL VALVE REPLACEMENT  11/30/2011   • TUBAL ABDOMINAL LIGATION  1989       No Known Allergies      Current Outpatient Prescriptions:   •  albuterol (PROVENTIL HFA;VENTOLIN HFA) 108 (90 Base) MCG/ACT inhaler, Inhale 2 puffs Every 6 (Six) Hours As Needed for  Wheezing., Disp: 9 g, Rfl: 0  •  amiodarone (PACERONE) 200 MG tablet, Take 1 tablet by mouth Daily., Disp: 30 tablet, Rfl: 12  •  atorvastatin (LIPITOR) 10 MG tablet, Take 10 mg by mouth Daily., Disp: , Rfl:   •  metoprolol tartrate (LOPRESSOR) 25 MG tablet, Take 0.5 tablets by mouth Every 12 (Twelve) Hours., Disp: 60 tablet, Rfl: 12  •  raNITIdine (ZANTAC) 300 MG tablet, Take 300 mg by mouth 2 (Two) Times a Day., Disp: , Rfl:   •  warfarin (COUMADIN) 1 MG tablet, Take 2 mg by mouth Daily. Take 1 mg x 2 with warfarin 5 mg to equal warfarin 7 mg daily, Disp: , Rfl:   •  warfarin (COUMADIN) 5 MG tablet, Take 5 mg by mouth Daily. Take with 1 mg x 2 to equal warfarin 7 mg daily, Disp: , Rfl:     The following portions of the patient's history were reviewed and updated as appropriate: allergies, current medications, past family history, past medical history, past social history, past surgical history and problem list.    Review of Systems   Constitution: Positive for malaise/fatigue. Negative for chills, decreased appetite, diaphoresis, fever, weakness, night sweats, weight gain and weight loss.   HENT: Positive for congestion. Negative for nosebleeds.    Eyes: Negative for blurred vision, visual disturbance and visual halos.   Cardiovascular: Positive for dyspnea on exertion and leg swelling. Negative for chest pain, claudication, cyanosis, irregular heartbeat, near-syncope, orthopnea, palpitations, paroxysmal nocturnal dyspnea and syncope.   Respiratory: Positive for cough, shortness of breath and snoring. Negative for hemoptysis, sleep disturbances due to breathing, sputum production and wheezing.    Endocrine: Positive for polydipsia. Negative for cold intolerance, heat intolerance, polyphagia and polyuria.   Hematologic/Lymphatic: Does not bruise/bleed easily.   Skin: Negative for dry skin, itching and rash.   Musculoskeletal: Negative for falls, joint pain, joint swelling, muscle weakness and myalgias.  "  Gastrointestinal: Negative for bloating, abdominal pain, constipation, diarrhea, dysphagia, heartburn, melena, nausea and vomiting.   Genitourinary: Negative for dysuria, flank pain, hematuria and nocturia.   Neurological: Positive for excessive daytime sleepiness. Negative for difficulty with concentration, dizziness, headaches and loss of balance.   Psychiatric/Behavioral: Negative for altered mental status and depression. The patient is not nervous/anxious.    Allergic/Immunologic: Negative for environmental allergies.       Objective:     Vitals:    02/19/18 1237 02/19/18 1253 02/19/18 1255   BP: 132/83 130/85 131/97   BP Location: Right arm Left arm    Patient Position: Sitting Sitting    Pulse: 68 73 61   Resp: 18     Temp: 97.9 °F (36.6 °C)     TempSrc: Temporal Artery      SpO2: 99%     Weight: 94 kg (207 lb 3.2 oz)     Height: 162.6 cm (64\")           Physical Exam   Constitutional: She is oriented to person, place, and time. She appears well-developed and well-nourished. No distress.   HENT:   Head: Normocephalic and atraumatic.   Mouth/Throat: Oropharynx is clear and moist.   Eyes: Conjunctivae are normal. Pupils are equal, round, and reactive to light. No scleral icterus.   Neck: No hepatojugular reflux and no JVD present. Carotid bruit is not present. No tracheal deviation present. No thyromegaly present.   Cardiovascular: Normal rate, regular rhythm, normal heart sounds and intact distal pulses.  Exam reveals no friction rub.    No murmur heard.  Pulmonary/Chest: Effort normal. She has decreased breath sounds. She has rales in the left lower field.   Abdominal: Soft. Bowel sounds are normal. She exhibits no distension. There is no tenderness.   Musculoskeletal: She exhibits edema (1+ ankle edema).   Lymphadenopathy:     She has no cervical adenopathy.   Neurological: She is alert and oriented to person, place, and time.   Skin: Skin is warm, dry and intact. No rash noted. No cyanosis or erythema. No " pallor.   Psychiatric: She has a normal mood and affect. Her behavior is normal. Thought content normal.   Vitals reviewed.      Lab and Diagnostic Review:  Lab Results   Component Value Date    GLUCOSE 80 02/02/2018    CALCIUM 9.6 02/02/2018     02/02/2018    K 4.2 02/02/2018    CO2 21.0 02/02/2018     02/02/2018    BUN 14 02/02/2018    CREATININE 0.70 02/02/2018    EGFRIFNONA 89 02/02/2018    BCR 20.0 02/02/2018    ANIONGAP 10.0 02/02/2018     Lab Results   Component Value Date    WBC 7.92 02/02/2018    HGB 13.1 02/02/2018    HCT 41.3 02/02/2018    MCV 85.7 02/02/2018     02/02/2018     Lab Results   Component Value Date    TSH 1.254 01/30/2018     echo 01/30/18:  Normal systolic function, mild TR    Assessment and Plan:         1. Persistent atrial fibrillation    - ECG 12 Lead; A. fib 65 bpm    Rate controlled on beta blocker.  Recently started on amiodarone.  Follow-up with Dr. Wilson as scheduled and determine if ECV is warranted.    A. fib education completed: What is atrial fibrillation, causes, triggers, signs and symptoms, medication management (rate control versus rhythm control) and stroke prevention, procedural management and indications, and the role of the atrial fibrillation center and when to call.    Patient to continue Coumadin, history of mechanical valve, A. fib, status post CVA.  INR monitored with PCP.  Discussed the importance of maintaining a therapeutic INR for stroke prevention.    2. VHD (valvular heart disease) s/p AVR, MVR 2011  Chronic anticoagulation    3. Acute on chronic diastolic congestive heart failure    - furosemide (LASIX) 40 MG tablet; Take 0.5 tablets by mouth Daily.  Dispense: 15 tablet; Refill: 3  - potassium chloride (K-DUR,KLOR-CON) 20 MEQ CR tablet; Take 0.5 tablets by mouth Daily.  Dispense: 15 tablet; Refill: 3    Heart failure education discussed: What is heart failure, causes, signs and symptoms, medication management, daily weight monitoring,  low-sodium diet of less than 2 g per day, daily exercise, role the heart failure center. Pt provided scale today.    4. Anticoagulated on Coumadin  Managed by PCP    F/u with Dr. Wilson as scheduled ( approx 4 weeks).  F/u H&V 3 months or sooner if needed.        *Please note that portions of this note were completed with a voice recognition program. Efforts were made to edit the dictations, but occasionally words are mistranscribed.

## 2018-05-25 ENCOUNTER — OFFICE VISIT (OUTPATIENT)
Dept: CARDIOLOGY | Facility: HOSPITAL | Age: 53
End: 2018-05-25

## 2018-05-25 VITALS
RESPIRATION RATE: 18 BRPM | WEIGHT: 218 LBS | OXYGEN SATURATION: 97 % | SYSTOLIC BLOOD PRESSURE: 138 MMHG | HEIGHT: 64 IN | HEART RATE: 135 BPM | BODY MASS INDEX: 37.22 KG/M2 | DIASTOLIC BLOOD PRESSURE: 89 MMHG | TEMPERATURE: 97.9 F

## 2018-05-25 DIAGNOSIS — I09.1 RHEUMATIC VALVULAR DISEASE: ICD-10-CM

## 2018-05-25 DIAGNOSIS — I48.19 PERSISTENT ATRIAL FIBRILLATION (HCC): Primary | ICD-10-CM

## 2018-05-25 DIAGNOSIS — I50.32 CHRONIC DIASTOLIC CONGESTIVE HEART FAILURE (HCC): ICD-10-CM

## 2018-05-25 PROCEDURE — 99214 OFFICE O/P EST MOD 30 MIN: CPT | Performed by: NURSE PRACTITIONER

## 2018-05-25 RX ORDER — METOPROLOL TARTRATE 75 MG/1
75 TABLET, FILM COATED ORAL EVERY 12 HOURS SCHEDULED
Qty: 60 TABLET | Refills: 3 | Status: SHIPPED | OUTPATIENT
Start: 2018-05-25

## 2018-05-25 RX ORDER — METOPROLOL TARTRATE 50 MG/1
50 TABLET, FILM COATED ORAL 2 TIMES DAILY
COMMUNITY
End: 2018-05-25 | Stop reason: SDUPTHER

## 2018-05-25 NOTE — PROGRESS NOTES
[facility]      Encounter Date:05/25/2018     Lavern Youngblood  [address]  [place] [address]  [phone]    1965    Cruz Rodriguez MD    Lavern Youngblood is a 53 y.o. female.      Subjective:     Chief Complaint:  Follow-up (afib, DHF)       HPI     Patient with a history of atrial fibrillation and chronic diastolic heart failure, rheumatic valvular heart disease status post mechanical aortic and mitral valve replacement chronically anticoagulated with Coumadin.  Patient of Dr. Wilson.  Recent hospitalization found to be in A. fib with RVR discharged on amiodarone.      Pt reports Dr. Wilson stopped amio because she was staying in AFib.  BB increased.  Pt reports intermittent palpitations, fatigue.  Mild intermittent dyspnea. Mild intermittent edema, but worse over the 2 days, but reports forgot to take water pill.   Denies CP, pressure, dizziness, syncope.      Patient Active Problem List    Diagnosis   • Persistent atrial fibrillation [I48.1]   • VHD (valvular heart disease) s/p AVR, MVR 2011 [I38]   • Chronic diastolic congestive heart failure [I50.32]   • Former smoker, quit 2010 [Z87.891]   • Anticoagulated on Coumadin [Z51.81, Z79.01]   • Rheumatic valvular disease as reason for MVR and AVR [I09.1]   • H/O: CVA with residual memory loss, mild right sided weakness, 2010 [Z86.73]   • Subtherapeutic international normalized ratio (INR) [R79.1]   • Improved RUL Infiltrate (R/O CAP, atelectasis) [R91.8]         Past Surgical History:   Procedure Laterality Date   • AORTIC VALVE REPAIR/REPLACEMENT  11/30/2011   • MITRAL VALVE REPLACEMENT  11/30/2011   • TUBAL ABDOMINAL LIGATION  1989       No Known Allergies      Current Outpatient Prescriptions:   •  albuterol (PROVENTIL HFA;VENTOLIN HFA) 108 (90 Base) MCG/ACT inhaler, Inhale 2 puffs Every 6 (Six) Hours As Needed for Wheezing., Disp: 9 g, Rfl: 0  •  furosemide (LASIX) 40 MG tablet, Take 0.5 tablets by mouth Daily., Disp:  15 tablet, Rfl: 3  •  metoprolol tartrate 50 MG tablet, Take 75 mg by mouth Every 12 (Twelve) Hours., Disp: 60 tablet, Rfl: 3  •  potassium chloride (K-DUR,KLOR-CON) 20 MEQ CR tablet, Take 0.5 tablets by mouth Daily., Disp: 15 tablet, Rfl: 3  •  atorvastatin (LIPITOR) 10 MG tablet, Take 10 mg by mouth Daily., Disp: , Rfl:   •  ferrous sulfate 325 (65 FE) MG tablet, Take 325 mg by mouth Daily., Disp: , Rfl:   •  raNITIdine (ZANTAC) 300 MG tablet, Take 300 mg by mouth 2 (Two) Times a Day., Disp: , Rfl:   •  warfarin (COUMADIN) 1 MG tablet, Take 2 mg by mouth Daily. Take 1 mg x 2 with warfarin 5 mg to equal warfarin 7 mg daily, Disp: , Rfl:   •  warfarin (COUMADIN) 5 MG tablet, Take 5 mg by mouth Daily. Take with 1 mg x 2 to equal warfarin 7 mg daily, Disp: , Rfl:     The following portions of the patient's history were reviewed and updated as appropriate: allergies, current medications, past family history, past medical history, past social history, past surgical history and problem list.    Review of Systems   Constitution: Positive for malaise/fatigue. Negative for chills, decreased appetite, diaphoresis, fever, weakness, night sweats, weight gain and weight loss.   HENT: Positive for congestion. Negative for nosebleeds.    Eyes: Negative for blurred vision, visual disturbance and visual halos.   Cardiovascular: Positive for dyspnea on exertion and leg swelling. Negative for chest pain, claudication, cyanosis, irregular heartbeat, near-syncope, orthopnea, palpitations, paroxysmal nocturnal dyspnea and syncope.   Respiratory: Positive for cough, shortness of breath and snoring. Negative for hemoptysis, sleep disturbances due to breathing, sputum production and wheezing.    Endocrine: Positive for polydipsia. Negative for cold intolerance, heat intolerance, polyphagia and polyuria.   Hematologic/Lymphatic: Does not bruise/bleed easily.   Skin: Negative for dry skin, itching and rash.   Musculoskeletal: Negative for  "falls, joint pain, joint swelling, muscle weakness and myalgias.   Gastrointestinal: Negative for bloating, abdominal pain, constipation, diarrhea, dysphagia, heartburn, melena, nausea and vomiting.   Genitourinary: Negative for dysuria, flank pain, hematuria and nocturia.   Neurological: Positive for excessive daytime sleepiness. Negative for difficulty with concentration, dizziness, headaches and loss of balance.   Psychiatric/Behavioral: Negative for altered mental status and depression. The patient is not nervous/anxious.    Allergic/Immunologic: Negative for environmental allergies.       Objective:     Vitals:    05/25/18 1422 05/25/18 1425 05/25/18 1427   BP: 130/86 136/91 138/89   BP Location: Right arm Left arm Left arm   Patient Position: Sitting Sitting Standing   Pulse: 111  (!) 135   Resp: 18     Temp: 97.9 °F (36.6 °C)     TempSrc: Temporal Artery      SpO2: 97%     Weight: 98.9 kg (218 lb)     Height: 162.6 cm (64.02\")           Physical Exam   Constitutional: She is oriented to person, place, and time. She appears well-developed and well-nourished. No distress.   HENT:   Head: Normocephalic and atraumatic.   Mouth/Throat: Oropharynx is clear and moist.   Eyes: Conjunctivae are normal. Pupils are equal, round, and reactive to light. No scleral icterus.   Neck: No hepatojugular reflux and no JVD present. Carotid bruit is not present. No tracheal deviation present. No thyromegaly present.   Cardiovascular: Intact distal pulses.  An irregularly irregular rhythm present. Tachycardia present.  Exam reveals no friction rub.    No murmur heard.  click   Pulmonary/Chest: Effort normal and breath sounds normal.   Abdominal: Soft. Bowel sounds are normal. She exhibits no distension. There is no tenderness.   Musculoskeletal: She exhibits no edema.   Lymphadenopathy:     She has no cervical adenopathy.   Neurological: She is alert and oriented to person, place, and time.   Skin: Skin is warm, dry and intact. No " rash noted. No cyanosis or erythema. No pallor.   Psychiatric: She has a normal mood and affect. Her behavior is normal. Thought content normal.   Vitals reviewed.      Lab and Diagnostic Review:  Lab Results   Component Value Date    WBC 7.92 02/02/2018    HGB 13.1 02/02/2018    HCT 41.3 02/02/2018    MCV 85.7 02/02/2018     02/02/2018     Lab Results   Component Value Date    GLUCOSE 80 02/02/2018    CALCIUM 9.6 02/02/2018     02/02/2018    K 4.2 02/02/2018    CO2 21.0 02/02/2018     02/02/2018    BUN 14 02/02/2018    CREATININE 0.70 02/02/2018    EGFRIFNONA 89 02/02/2018    BCR 20.0 02/02/2018    ANIONGAP 10.0 02/02/2018       Assessment and Plan:         1. Persistent atrial fibrillation  -135.  Increase:  - metoprolol tartrate 75 MG tablet; Take 75 mg by mouth Every 12 (Twelve) Hours.  Dispense: 60 tablet; Refill: 3    Continue coumadin, managed by PCP    2. Chronic diastolic congestive heart failure  Lasix 20 mg daily  Review s/s HF worsening and when to call    3. Rheumatic valvular disease as reason for MVR and AVR  coumadin    F/u 2 weeks or sooner if needed.    *Please note that portions of this note were completed with a voice recognition program. Efforts were made to edit the dictations, but occasionally words are mistranscribed.

## 2018-10-04 ENCOUNTER — EPISODE CHANGES (OUTPATIENT)
Dept: CASE MANAGEMENT | Facility: OTHER | Age: 53
End: 2018-10-04

## 2019-01-29 ENCOUNTER — TRANSCRIBE ORDERS (OUTPATIENT)
Dept: ADMINISTRATIVE | Facility: HOSPITAL | Age: 54
End: 2019-01-29

## 2019-01-29 DIAGNOSIS — J44.9 CHRONIC OBSTRUCTIVE PULMONARY DISEASE, UNSPECIFIED COPD TYPE (HCC): Primary | ICD-10-CM

## 2019-01-31 ENCOUNTER — APPOINTMENT (OUTPATIENT)
Dept: RESPIRATORY THERAPY | Facility: HOSPITAL | Age: 54
End: 2019-01-31

## 2019-02-12 ENCOUNTER — HOSPITAL ENCOUNTER (OUTPATIENT)
Dept: RESPIRATORY THERAPY | Facility: HOSPITAL | Age: 54
Discharge: HOME | End: 2019-02-12

## 2019-02-12 DIAGNOSIS — J44.9 CHRONIC OBSTRUCTIVE PULMONARY DISEASE, UNSPECIFIED COPD TYPE (HCC): ICD-10-CM

## 2019-02-22 ENCOUNTER — HOSPITAL ENCOUNTER (OUTPATIENT)
Dept: MAMMOGRAPHY | Facility: HOSPITAL | Age: 54
Discharge: HOME | End: 2019-02-22
Admitting: FAMILY MEDICINE

## 2019-02-22 ENCOUNTER — HOSPITAL ENCOUNTER (OUTPATIENT)
Dept: BONE DENSITY | Facility: HOSPITAL | Age: 54
Discharge: HOME | End: 2019-02-22

## 2019-02-22 DIAGNOSIS — N95.9 MENOPAUSAL PROBLEM: ICD-10-CM

## 2019-02-22 DIAGNOSIS — Z12.39 SCREENING BREAST EXAMINATION: ICD-10-CM

## 2019-02-22 PROCEDURE — 77067 SCR MAMMO BI INCL CAD: CPT | Performed by: RADIOLOGY

## 2019-02-22 PROCEDURE — 77067 SCR MAMMO BI INCL CAD: CPT

## 2019-02-22 PROCEDURE — 77080 DXA BONE DENSITY AXIAL: CPT

## 2019-02-22 PROCEDURE — 77080 DXA BONE DENSITY AXIAL: CPT | Performed by: RADIOLOGY

## 2019-02-22 PROCEDURE — 77063 BREAST TOMOSYNTHESIS BI: CPT

## 2019-02-22 PROCEDURE — 77063 BREAST TOMOSYNTHESIS BI: CPT | Performed by: RADIOLOGY

## 2019-03-14 ENCOUNTER — HOSPITAL ENCOUNTER (OUTPATIENT)
Dept: MAMMOGRAPHY | Facility: HOSPITAL | Age: 54
Discharge: HOME | End: 2019-03-14
Admitting: RADIOLOGY

## 2019-03-14 ENCOUNTER — HOSPITAL ENCOUNTER (OUTPATIENT)
Dept: ULTRASOUND IMAGING | Facility: HOSPITAL | Age: 54
Discharge: HOME | End: 2019-03-14

## 2019-03-14 DIAGNOSIS — R92.8 ABNORMAL MAMMOGRAM OF RIGHT BREAST: ICD-10-CM

## 2019-03-14 PROCEDURE — 77065 DX MAMMO INCL CAD UNI: CPT

## 2019-03-14 PROCEDURE — 76642 ULTRASOUND BREAST LIMITED: CPT | Performed by: RADIOLOGY

## 2019-03-14 PROCEDURE — G0279 TOMOSYNTHESIS, MAMMO: HCPCS | Performed by: RADIOLOGY

## 2019-03-14 PROCEDURE — G0279 TOMOSYNTHESIS, MAMMO: HCPCS

## 2019-03-14 PROCEDURE — 76642 ULTRASOUND BREAST LIMITED: CPT

## 2019-03-14 PROCEDURE — 77065 DX MAMMO INCL CAD UNI: CPT | Performed by: RADIOLOGY

## 2019-03-21 ENCOUNTER — TELEPHONE (OUTPATIENT)
Dept: SURGERY | Facility: CLINIC | Age: 54
End: 2019-03-21

## 2019-03-22 ENCOUNTER — OFFICE VISIT (OUTPATIENT)
Dept: SURGERY | Facility: CLINIC | Age: 54
End: 2019-03-22

## 2019-03-22 VITALS
WEIGHT: 221.8 LBS | HEIGHT: 64 IN | DIASTOLIC BLOOD PRESSURE: 98 MMHG | SYSTOLIC BLOOD PRESSURE: 140 MMHG | HEART RATE: 89 BPM | BODY MASS INDEX: 37.87 KG/M2

## 2019-03-22 DIAGNOSIS — R92.8 ABNORMAL MAMMOGRAM: Primary | ICD-10-CM

## 2019-03-22 DIAGNOSIS — R92.8 ABNORMAL ULTRASOUND OF BREAST: ICD-10-CM

## 2019-03-22 PROCEDURE — 99203 OFFICE O/P NEW LOW 30 MIN: CPT | Performed by: SURGERY

## 2019-03-22 PROCEDURE — 19083 BX BREAST 1ST LESION US IMAG: CPT | Performed by: SURGERY

## 2019-03-22 NOTE — PROGRESS NOTES
Subjective   Lavern Youngblood is a 53 y.o. female here today for lump in right breast [facility].    History of Present Illness  Ms. Youngblood was seen in the office today for breast evaluation.  This is a 53-year-old female who underwent a screening mammogram on 2/22/19 this demonstrated an area of architectural distortion in the right breast.  The patient then underwent a right diagnostic mammogram and ultrasound on 3/14/19 which demonstrated a 1.9 cm irregular mass at 12:00 14 cm from the nipple which correlated with the mammographic findings.  Biopsy was recommended.  Patient is on Coumadin anticoagulation status post mechanical aortic and mitral valve replacement.  The patient denies a palpable mass or nipple discharge.  There is no prior history of breast biopsy or cyst aspiration.  As far as risk factors go, Lavern was 20 at the time that she had her first child, with an onset of menses at age 14.  There is no family history of breast or ovarian cancer.  The patient is postmenopausal and is not on hormone replacement therapy.  No Known Allergies  Current Outpatient Medications   Medication Sig Dispense Refill   • albuterol (PROVENTIL HFA;VENTOLIN HFA) 108 (90 Base) MCG/ACT inhaler Inhale 2 puffs Every 6 (Six) Hours As Needed for Wheezing. 9 g 0   • atorvastatin (LIPITOR) 10 MG tablet Take 10 mg by mouth Daily.     • ferrous sulfate 325 (65 FE) MG tablet Take 325 mg by mouth Daily.     • furosemide (LASIX) 40 MG tablet Take 0.5 tablets by mouth Daily. 15 tablet 3   • metoprolol tartrate 75 MG tablet Take 75 mg by mouth Every 12 (Twelve) Hours. 60 tablet 3   • potassium chloride (K-DUR,KLOR-CON) 20 MEQ CR tablet Take 0.5 tablets by mouth Daily. 15 tablet 3   • raNITIdine (ZANTAC) 300 MG tablet Take 300 mg by mouth 2 (Two) Times a Day.     • warfarin (COUMADIN) 1 MG tablet Take 2 mg by mouth Daily. Take 1 mg x 2 with warfarin 5 mg to equal warfarin 7 mg daily     • warfarin (COUMADIN) 5 MG tablet Take  "5 mg by mouth Daily. Take with 1 mg x 2 to equal warfarin 7 mg daily       No current facility-administered medications for this visit.      Past Medical History:   Diagnosis Date   • Atrial fibrillation (CMS/HCC)    • Chronic anticoagulation    • CVA (cerebral vascular accident) (CMS/HCC)    • Diastolic CHF, chronic (CMS/HCC)    • Rheumatic valvular disease as reason for MVR and AVR 1/30/2018     Past Surgical History:   Procedure Laterality Date   • AORTIC VALVE REPAIR/REPLACEMENT  11/30/2011   • MITRAL VALVE REPLACEMENT  11/30/2011   • TUBAL ABDOMINAL LIGATION  1989     Review of Systems  General: weight gain 5 lbs  Integumentary: negative  Eyes: glasses, eyes itch  ENT: negative  Respiratory: shortness of breath  Gastrointestinal: negative  Cardiovascular: rapid heart rate  Neurological: negative  Psychiatric: negative  Hematologic/Lymphatic: negative  Genitourinary: negative  Musculoskeletal: negative  Endocrine: negative  Breasts: negative        Objective   /98 (BP Location: Left arm)   Pulse 89   Ht 162.6 cm (64\")   Wt 101 kg (221 lb 12.8 oz)   BMI 38.07 kg/m²   Physical Exam  General:  This is a WD WN female in no acute distress  Vital signs stable, afebrile  HEENT exam:  WNL. Sclera are anicteric.  EOMI  Neck:  supple, FROM.  No JVD.  Trachea midline.  No palpable thyroid nodules. No supraclavicular adenopathy  Lungs:  Respiratory effort normal. Auscultation: Clear, without wheezes, rhonchi, rales  Heart:  Regular rate and rhythm, without murmur, gallop, rub.  No pedal edema  Breasts: On visual inspection the breasts are symmetrical, large and pendulous.  Examination of the right breast demonstrates no discrete mass, skin change, or axillary adenopathy.  Examination of the left breast demonstrates no discrete mass, skin change, or axillary adenopathy.  Abdomen: Nontender, without hepatosplenomegaly  Musculoskeletal:  muscle strength/tone is normal.    Psyc:  alert, oriented x 3.  Mood and affect " are appropriate  skin:  Warm with good turgor.  Without rash or lesion  extremities:  Examination of the extremities revealed no cyanosis, clubbing or edema.    Results/Data  Mammogram and ultrasound reports and images were reviewed and I agree with the assessment  Procedures   Ultrasound Guided Breast Biopsy Procedure Note    Preoperative Diagnosis: Hypoechoic lesion right breast, 12:00.    Postoperative Diagnosis: Same, pending final pathology report.    Operative Procedure Performed: Ultrasound guided core biopsy, right breast-18-gauge Giovany-Cut    Operating surgeon: Natalya Yeh M.D.    Anesthesia: 1% lidocaine with epinephrine 10 ccs.    Complications: none    Approach: Inferior to superior    Description of Procedure: After discussing the risks and benefits of the procedure and obtaining consent, the patient was placed on the procedure table in the supine position.  The right breast was scanned with a hand-held high frequency linear array ultrasound transducer, identifying the focal abnormality previously demonstrated on ultrasound examination.  The operative site was prepped in a sterile fashion with alcohol; local anesthetic was used to infiltrate the skin and subcutaneous tissue.  Under direct ultrasound observation, local anesthetic was placed around the focal abnormality.  A small incision was made with a scalpel blade.  Under direct ultrasound observation the core needle device was placed through the incision with the tip of the needle core device just proximal to the lesion.  Biopsy was obtained.  Post-fire images demonstrated the needle core device going through the abnormality.  Multiple cores were taken in a similar fashion.  A gel-lina clip was placed under ultrasound guidance.  Following removal of several cores, the instrument was removed and the area was cleansed and a dressing was placed.  The patient tolerated the procedure well.  Specimens were placed in formalin and sent for pathologic  evaluation.    The biopsy needle was demonstrated to be traversing the area of concern in 2 planes on several passes.  Tissue removed not grossly diagnostic of carcinoma    Assessment/Plan   Abnormal mammogram and ultrasound of right breast  Further recommendations pending pathology report         Discussion/Summary    Patient's Body mass index is 38.07 kg/m². BMI is above normal and info given.       Errors in dictation may reflect use of voice recognition software and not all errors in transcription may have been detected prior to signing.    No future appointments.

## 2019-03-23 PROBLEM — R92.8 ABNORMAL ULTRASOUND OF BREAST: Status: ACTIVE | Noted: 2019-03-23

## 2019-03-23 PROBLEM — R92.8 ABNORMAL MAMMOGRAM: Status: ACTIVE | Noted: 2019-03-23

## 2019-04-04 ENCOUNTER — OFFICE VISIT (OUTPATIENT)
Dept: SURGERY | Facility: CLINIC | Age: 54
End: 2019-04-04

## 2019-04-04 ENCOUNTER — TELEPHONE (OUTPATIENT)
Dept: SURGERY | Facility: CLINIC | Age: 54
End: 2019-04-04

## 2019-04-04 VITALS
SYSTOLIC BLOOD PRESSURE: 150 MMHG | HEIGHT: 64 IN | HEART RATE: 64 BPM | DIASTOLIC BLOOD PRESSURE: 91 MMHG | WEIGHT: 220.4 LBS | BODY MASS INDEX: 37.63 KG/M2

## 2019-04-04 DIAGNOSIS — I38 VHD (VALVULAR HEART DISEASE): ICD-10-CM

## 2019-04-04 DIAGNOSIS — R92.8 ABNORMAL MAMMOGRAM: Primary | ICD-10-CM

## 2019-04-04 DIAGNOSIS — Z79.01 ANTICOAGULATED ON COUMADIN: ICD-10-CM

## 2019-04-04 PROCEDURE — 99213 OFFICE O/P EST LOW 20 MIN: CPT | Performed by: SURGERY

## 2019-04-04 NOTE — PROGRESS NOTES
"Subjective   Lavern Youngblood is a 53 y.o. female here today for pathology results.    History of Present Illness  Ms. Youngblood was seen in the office today to discuss the results of her core biopsy which demonstrated adenosis and fibrosis.   This is a 53-year-old female who underwent a screening mammogram on 2/22/19 this demonstrated an area of architectural distortion in the right breast.  The patient then underwent a right diagnostic mammogram and ultrasound on 3/14/19 which demonstrated a 1.9 cm irregular mass at 12:00 14 cm from the nipple which correlated with the mammographic findings.  Biopsy was recommended.  Patient is on Coumadin anticoagulation status post mechanical aortic and mitral valve replacement.  The patient denies a palpable mass or nipple discharge.  There is no prior history of breast biopsy or cyst aspiration.  No Known Allergies      Current Outpatient Medications   Medication Sig Dispense Refill   • albuterol (PROVENTIL HFA;VENTOLIN HFA) 108 (90 Base) MCG/ACT inhaler Inhale 2 puffs Every 6 (Six) Hours As Needed for Wheezing. 9 g 0   • atorvastatin (LIPITOR) 10 MG tablet Take 10 mg by mouth Daily.     • ferrous sulfate 325 (65 FE) MG tablet Take 325 mg by mouth Daily.     • furosemide (LASIX) 40 MG tablet Take 0.5 tablets by mouth Daily. 15 tablet 3   • metoprolol tartrate 75 MG tablet Take 75 mg by mouth Every 12 (Twelve) Hours. 60 tablet 3   • potassium chloride (K-DUR,KLOR-CON) 20 MEQ CR tablet Take 0.5 tablets by mouth Daily. 15 tablet 3   • raNITIdine (ZANTAC) 300 MG tablet Take 300 mg by mouth 2 (Two) Times a Day.     • warfarin (COUMADIN) 1 MG tablet Take 2 mg by mouth Daily. Take 1 mg x 2 with warfarin 5 mg to equal warfarin 7 mg daily     • warfarin (COUMADIN) 5 MG tablet Take 5 mg by mouth Daily. Take with 1 mg x 2 to equal warfarin 7 mg daily       No current facility-administered medications for this visit.      Objective   Ht 162.6 cm (64\")   Wt 100 kg (220 lb 6.4 oz)   BMI 37.83 " kg/m²    Physical Exam    Results/Data  Pathology report was reviewed and discussed with the patient    Procedures     Assessment/Plan   Abnormal mammogram and ultrasound of the right breast with benign findings on core biopsy.  I did discuss this case with breast radiologist Dr. Aviles.  I did feel confident that my needle was in the ultrasound abnormality.  As the patient does have 2 mechanical valves and surgical biopsy would need to stop Coumadin with Lovenox bridge I asked Dr. Aviles how suspicious she was of the abnormality.  She reported that the ultrasound findings were fairly subtle but the mammogram findings on the tomosynthesis images were much more concerning and she did feel that a surgical biopsy should be performed.    Plan: We will obtain notes from the patient's cardiologist and plan surgical biopsy following that           Discussion/Summary    Errors in dictation may reflect use of voice recognition software and not all errors in transcription may have been detected prior to signing.    No future appointments.

## 2019-04-04 NOTE — TELEPHONE ENCOUNTER
Called and ask for surgical clearance. They told me he is out this week but will see it next week and if cleared will send fax. KATHRYN

## 2019-04-12 ENCOUNTER — TELEPHONE (OUTPATIENT)
Dept: SURGERY | Facility: CLINIC | Age: 54
End: 2019-04-12

## 2019-04-12 NOTE — TELEPHONE ENCOUNTER
Miss Puckett is to come to our office on the 5/2 then to PAT. Her surgery date is the 7th. KATHRYN

## 2019-04-29 RX ORDER — CEFAZOLIN SODIUM 2 G/50ML
2 SOLUTION INTRAVENOUS ONCE
Status: CANCELLED | OUTPATIENT
Start: 2019-05-07 | End: 2019-04-29

## 2019-04-29 NOTE — H&P
"Subjective   Lavern Youngblood is a 53 y.o. female here today for pathology results.    History of Present Illness  Ms. Youngblood was seen in the office today to discuss the results of her core biopsy which demonstrated adenosis and fibrosis.   This is a 53-year-old female who underwent a screening mammogram on 2/22/19 this demonstrated an area of architectural distortion in the right breast.  The patient then underwent a right diagnostic mammogram and ultrasound on 3/14/19 which demonstrated a 1.9 cm irregular mass at 12:00 14 cm from the nipple which correlated with the mammographic findings.  Biopsy was recommended.  Patient is on Coumadin anticoagulation status post mechanical aortic and mitral valve replacement.  The patient denies a palpable mass or nipple discharge.  There is no prior history of breast biopsy or cyst aspiration.  No Known Allergies      Current Outpatient Medications   Medication Sig Dispense Refill   • albuterol (PROVENTIL HFA;VENTOLIN HFA) 108 (90 Base) MCG/ACT inhaler Inhale 2 puffs Every 6 (Six) Hours As Needed for Wheezing. 9 g 0   • atorvastatin (LIPITOR) 10 MG tablet Take 10 mg by mouth Daily.     • ferrous sulfate 325 (65 FE) MG tablet Take 325 mg by mouth Daily.     • furosemide (LASIX) 40 MG tablet Take 0.5 tablets by mouth Daily. 15 tablet 3   • metoprolol tartrate 75 MG tablet Take 75 mg by mouth Every 12 (Twelve) Hours. 60 tablet 3   • potassium chloride (K-DUR,KLOR-CON) 20 MEQ CR tablet Take 0.5 tablets by mouth Daily. 15 tablet 3   • raNITIdine (ZANTAC) 300 MG tablet Take 300 mg by mouth 2 (Two) Times a Day.     • warfarin (COUMADIN) 1 MG tablet Take 2 mg by mouth Daily. Take 1 mg x 2 with warfarin 5 mg to equal warfarin 7 mg daily     • warfarin (COUMADIN) 5 MG tablet Take 5 mg by mouth Daily. Take with 1 mg x 2 to equal warfarin 7 mg daily       No current facility-administered medications for this visit.      Objective   Ht 162.6 cm (64\")   Wt 100 kg (220 lb 6.4 oz)   BMI 37.83 " kg/m²     Physical Exam    Results/Data  Pathology report was reviewed and discussed with the patient    Procedures     Assessment/Plan   Abnormal mammogram and ultrasound of the right breast with benign findings on core biopsy.  I did discuss this case with breast radiologist Dr. Aviles.  I did feel confident that my needle was in the ultrasound abnormality.  As the patient does have 2 mechanical valves and surgical biopsy would need to stop Coumadin with Lovenox bridge I asked Dr. Aviles how suspicious she was of the abnormality.  She reported that the ultrasound findings were fairly subtle but the mammogram findings on the tomosynthesis images were much more concerning and she did feel that a surgical biopsy should be performed.    Plan: We will obtain notes from the patient's cardiologist and plan surgical biopsy following that           Discussion/Summary    Errors in dictation may reflect use of voice recognition software and not all errors in transcription may have been detected prior to signing.    No future appointments.    This document has been electronically signed by Natalya SALCEDO MD on April 29, 2019 5:57 PM

## 2019-04-30 ENCOUNTER — TELEPHONE (OUTPATIENT)
Dept: SURGERY | Facility: CLINIC | Age: 54
End: 2019-04-30

## 2019-05-02 ENCOUNTER — OFFICE VISIT (OUTPATIENT)
Dept: SURGERY | Facility: CLINIC | Age: 54
End: 2019-05-02

## 2019-05-02 ENCOUNTER — APPOINTMENT (OUTPATIENT)
Dept: PREADMISSION TESTING | Facility: HOSPITAL | Age: 54
End: 2019-05-02

## 2019-05-02 VITALS
HEIGHT: 64 IN | HEART RATE: 63 BPM | WEIGHT: 222.4 LBS | SYSTOLIC BLOOD PRESSURE: 106 MMHG | BODY MASS INDEX: 37.97 KG/M2 | DIASTOLIC BLOOD PRESSURE: 84 MMHG

## 2019-05-02 DIAGNOSIS — R92.8 ABNORMAL MAMMOGRAM: ICD-10-CM

## 2019-05-02 DIAGNOSIS — I09.1 RHEUMATIC VALVULAR DISEASE: Primary | ICD-10-CM

## 2019-05-02 LAB
ANION GAP SERPL CALCULATED.3IONS-SCNC: 9.4 MMOL/L
BASOPHILS # BLD AUTO: 0.04 10*3/MM3 (ref 0–0.2)
BASOPHILS NFR BLD AUTO: 0.6 % (ref 0–1.5)
BUN BLD-MCNC: 17 MG/DL (ref 6–20)
BUN/CREAT SERPL: 25 (ref 7–25)
CALCIUM SPEC-SCNC: 10.7 MG/DL (ref 8.6–10.5)
CHLORIDE SERPL-SCNC: 105 MMOL/L (ref 98–107)
CO2 SERPL-SCNC: 23.6 MMOL/L (ref 22–29)
CREAT BLD-MCNC: 0.68 MG/DL (ref 0.57–1)
DEPRECATED RDW RBC AUTO: 45.2 FL (ref 37–54)
EOSINOPHIL # BLD AUTO: 0.57 10*3/MM3 (ref 0–0.4)
EOSINOPHIL NFR BLD AUTO: 8.4 % (ref 0.3–6.2)
ERYTHROCYTE [DISTWIDTH] IN BLOOD BY AUTOMATED COUNT: 14.9 % (ref 12.3–15.4)
GFR SERPL CREATININE-BSD FRML MDRD: 91 ML/MIN/1.73
GLUCOSE BLD-MCNC: 87 MG/DL (ref 65–99)
HCT VFR BLD AUTO: 42.9 % (ref 34–46.6)
HGB BLD-MCNC: 13.7 G/DL (ref 12–15.9)
IMM GRANULOCYTES # BLD AUTO: 0.01 10*3/MM3 (ref 0–0.05)
IMM GRANULOCYTES NFR BLD AUTO: 0.1 % (ref 0–0.5)
LYMPHOCYTES # BLD AUTO: 1.91 10*3/MM3 (ref 0.7–3.1)
LYMPHOCYTES NFR BLD AUTO: 28.1 % (ref 19.6–45.3)
MCH RBC QN AUTO: 26.9 PG (ref 26.6–33)
MCHC RBC AUTO-ENTMCNC: 31.9 G/DL (ref 31.5–35.7)
MCV RBC AUTO: 84.3 FL (ref 79–97)
MONOCYTES # BLD AUTO: 0.84 10*3/MM3 (ref 0.1–0.9)
MONOCYTES NFR BLD AUTO: 12.4 % (ref 5–12)
NEUTROPHILS # BLD AUTO: 3.42 10*3/MM3 (ref 1.7–7)
NEUTROPHILS NFR BLD AUTO: 50.4 % (ref 42.7–76)
PLATELET # BLD AUTO: 283 10*3/MM3 (ref 140–450)
PMV BLD AUTO: 11.2 FL (ref 6–12)
POTASSIUM BLD-SCNC: 4.5 MMOL/L (ref 3.5–5.2)
RBC # BLD AUTO: 5.09 10*6/MM3 (ref 3.77–5.28)
SODIUM BLD-SCNC: 138 MMOL/L (ref 136–145)
WBC NRBC COR # BLD: 6.79 10*3/MM3 (ref 3.4–10.8)

## 2019-05-02 PROCEDURE — 36415 COLL VENOUS BLD VENIPUNCTURE: CPT

## 2019-05-02 PROCEDURE — 99213 OFFICE O/P EST LOW 20 MIN: CPT | Performed by: SURGERY

## 2019-05-02 PROCEDURE — 85025 COMPLETE CBC W/AUTO DIFF WBC: CPT | Performed by: SURGERY

## 2019-05-02 PROCEDURE — 80048 BASIC METABOLIC PNL TOTAL CA: CPT | Performed by: SURGERY

## 2019-05-02 RX ORDER — ALENDRONATE SODIUM 40 MG/1
70 TABLET ORAL
COMMUNITY

## 2019-05-02 NOTE — PROGRESS NOTES
Subjective   Lavern Youngblood is a 54 y.o. female here today to talk about blood thinner and surgery date.    History of Present Illness  Ms. Youngblood was seen in the office today to discuss her upcoming surgery and arrange for lovenox bridge for anticoagulation.  The patient underwent a core biopsy which demonstrated adenosis and fibrosis. Breast radiologist did not feel that biopsy was concordant with imaging.  This is a 53-year-old female who underwent a screening mammogram on 2/22/19 this demonstrated an area of architectural distortion in the right breast.  The patient then underwent a right diagnostic mammogram and ultrasound on 3/14/19 which demonstrated a 1.9 cm irregular mass at 12:00 14 cm from the nipple which correlated with the mammographic findings.  Biopsy was recommended.  Patient is on Coumadin anticoagulation status post mechanical aortic and mitral valve replacement.  The patient denies a palpable mass or nipple discharge.  There is no prior history of breast biopsy or cyst aspiration.  No Known Allergies    Current Outpatient Medications   Medication Sig Dispense Refill   • albuterol (PROVENTIL HFA;VENTOLIN HFA) 108 (90 Base) MCG/ACT inhaler Inhale 2 puffs Every 6 (Six) Hours As Needed for Wheezing. 9 g 0   • atorvastatin (LIPITOR) 10 MG tablet Take 10 mg by mouth Daily.     • ferrous sulfate 325 (65 FE) MG tablet Take 325 mg by mouth Daily.     • furosemide (LASIX) 40 MG tablet Take 0.5 tablets by mouth Daily. 15 tablet 3   • metoprolol tartrate 75 MG tablet Take 75 mg by mouth Every 12 (Twelve) Hours. 60 tablet 3   • potassium chloride (K-DUR,KLOR-CON) 20 MEQ CR tablet Take 0.5 tablets by mouth Daily. 15 tablet 3   • raNITIdine (ZANTAC) 300 MG tablet Take 300 mg by mouth 2 (Two) Times a Day.     • warfarin (COUMADIN) 1 MG tablet Take 2 mg by mouth Daily. Take 1 mg x 2 with warfarin 5 mg to equal warfarin 7 mg daily     • warfarin (COUMADIN) 5 MG tablet Take 5 mg by mouth Daily. Take with 1 mg x 2 to  "equal warfarin 7 mg daily       No current facility-administered medications for this visit.      Past Medical History:   Diagnosis Date   • Atrial fibrillation (CMS/HCC)    • Chronic anticoagulation    • CVA (cerebral vascular accident) (CMS/HCC)    • Diastolic CHF, chronic (CMS/HCC)    • Rheumatic valvular disease as reason for MVR and AVR 1/30/2018   • Seizures (CMS/HCC)      Past Surgical History:   Procedure Laterality Date   • AORTIC VALVE REPAIR/REPLACEMENT  11/30/2011   • MITRAL VALVE REPLACEMENT  11/30/2011   • TUBAL ABDOMINAL LIGATION  1989     The following portions of the patient's history were reviewed and updated as appropriate: allergies, current medications, past family history, past medical history, past social history, past surgical history and problem list.    Review of systems:  Unchanged from prior except HPI    Objective   Ht 162.6 cm (64\")   Wt 101 kg (222 lb 6.4 oz)   BMI 38.17 kg/m²    Physical Exam was performed on 5/2/19  General:  This is a WD WN female in no acute distress  Vital signs stable, afebrile  HEENT exam:  WNL. Sclera are anicteric.  EOMI  Neck:  supple, FROM.  No JVD.  Trachea midline.  No palpable thyroid nodules. No supraclavicular adenopathy  Lungs:  Respiratory effort normal. Auscultation: Clear, without wheezes, rhonchi, rales  Heart:  Regular rate and rhythm, without murmur, gallop, rub.  No pedal edema  Breasts: On visual inspection the breasts are symmetrical, large and pendulous.  Examination of the right breast demonstrates no discrete mass, skin change, or axillary adenopathy.  Examination of the left breast demonstrates no discrete mass, skin change, or axillary adenopathy.  Abdomen: Nontender, without hepatosplenomegaly  Musculoskeletal:  muscle strength/tone is normal.    Psyc:  alert, oriented x 3.  Mood and affect are appropriate  skin:  Warm with good turgor.  Without rash or lesion  extremities:  Examination of the extremities revealed no cyanosis, clubbing " or edema  Results/Data      Procedures     Assessment/Plan   Abnormal mammogram and ultrasound of the right breast with benign findings on core biopsy.  I did discuss this case with breast radiologist Dr. Aviles.  I did feel confident that my needle was in the ultrasound abnormality.  As the patient does have 2 mechanical valves and surgical biopsy would need to stop Coumadin with Lovenox bridge I asked Dr. Aviles how suspicious she was of the abnormality.  She reported that the ultrasound findings were fairly subtle but the mammogram findings on the tomosynthesis images were much more concerning and she did feel that a surgical biopsy should be performed.    Proceed with needle localization bx         Discussion/Summary: anticoagulation plan was written out for patient.   willing to give lovenox shots    Patient's Body mass index is 38.17 kg/m². BMI is above normal and info given.       Errors in dictation may reflect use of voice recognition software and not all errors in transcription may have been detected prior to signing.    Future Appointments   Date Time Provider Department Center   5/2/2019 11:10 AM Natalya Yeh MD [facility] None   5/2/2019 12:15 PM PAT 3 [facility]

## 2019-05-02 NOTE — DISCHARGE INSTRUCTIONS
5/07/19   ARRIVAL TIME PER  TAKE the following medications the morning of surgery:  All heart or blood pressure medications    HOLD all diabetic medications the morning of surgery as ordered by physician.    Please discontinue all blood thinners and anticoagulants (except aspirin) prior to surgery as per your surgeon and cardiologist instructions.  Aspirin may be continued up to the day prior to surgery.     CHLORHEXIDINE CLOTHS GIVEN WITH INSTRUCTIONS AND FORM TO RETURN TO HOSPITAL    General Instructions:  · Do not eat or drink after midnight: includes water, mints, or gum. You may brush your teeth.  Dental appliances that are removable must be taken out day of surgery.  · Do not smoke, chew tobacco, or drink alcohol.  · Bring medications in original bottles, any inhalers and if applicable your C-PAP/BI-PAP machine.  · Bring any papers given to you in the doctor's office.  · Wear clean comfortable clothes and socks.  · Do not wear contact lenses or make-up. Bring a case for your glasses if applicable.  · Bring crutches or walker if applicable.  · Leave all other valuables and jewelry at home.    If you were given a blood bank ID arm band remember to bring it with you the day of surgery.    Preventing a Surgical Site Infection:  Shower the night before surgery (unless instructed other wise) using a fresh bar of anti-bacterial soap (such as Dial) and clean washcloth. Dry with a clean towel and dress in clean clothing.  For 2 to 3 days before surgery, avoid shaving with a razor near where you will have surgery because the razor can irritate skin and make it easier to develop an infection. Ask your surgeon if you will be receiving antibiotics prior to surgery.  Make sure you, your family, and all healthcare providers clear their hands with soap and water or an alcohol-based hand  before caring for you or your wound.  If at all possible, quit smoking as many days before surgery as you can.    Day of  surgery:  Upon arrival, a Pre-op nurse and Anesthesiologist will review your health history, obtain vital signs, and answer questions you may have. The only belongings needed at this time will be your home medications and if applicable your C-PAP/BI-PAP machine. If you are staying overnight your family can leave the rest of your belongings in the car and bring them to your room later. A Pre-op nurse will start an IV and you may receive medication in preparation for surgery, including something to help you relax. Your family will be able to see you in the Pre-op area. While you are in surgery your family should notify the waiting room  if they leave the waiting room area and provide a contact phone number.    Please be aware that surgery does come with discomfort. We want to make every effort to control your discomfort so please discuss any uncontrolled symptoms with your nurse. Your doctor will most likely have prescribed pain medications.    If you are going home after surgery you will receive individualized written care instructions before being discharged. A responsible adult must drive you to and from the hospital on the day of surgery and stay with you for 24 hours.    If you are staying overnight following surgery, you will be transported to your hospital room following the recovery period.  [facility] has all private rooms.    If you have any questions please call Pre-Admission Testing at [phone].  Deductibles and co-payments are collected on the day of service. Please be prepared to pay the required co-pay, deductible or deposit on the day of service as defined by your plan.  OFFICE

## 2019-05-04 NOTE — H&P
Subjective   Lavern Youngblood is a 54 y.o. female here today to talk about blood thinner and surgery date.    History of Present Illness  Ms. Youngblood was seen in the office today to discuss her upcoming surgery and arrange for lovenox bridge for anticoagulation.  The patient underwent a core biopsy which demonstrated adenosis and fibrosis. Breast radiologist did not feel that biopsy was concordant with imaging.  This is a 53-year-old female who underwent a screening mammogram on 2/22/19 this demonstrated an area of architectural distortion in the right breast.  The patient then underwent a right diagnostic mammogram and ultrasound on 3/14/19 which demonstrated a 1.9 cm irregular mass at 12:00 14 cm from the nipple which correlated with the mammographic findings.  Biopsy was recommended.  Patient is on Coumadin anticoagulation status post mechanical aortic and mitral valve replacement.  The patient denies a palpable mass or nipple discharge.  There is no prior history of breast biopsy or cyst aspiration.  No Known Allergies    Current Outpatient Medications   Medication Sig Dispense Refill   • albuterol (PROVENTIL HFA;VENTOLIN HFA) 108 (90 Base) MCG/ACT inhaler Inhale 2 puffs Every 6 (Six) Hours As Needed for Wheezing. 9 g 0   • atorvastatin (LIPITOR) 10 MG tablet Take 10 mg by mouth Daily.     • ferrous sulfate 325 (65 FE) MG tablet Take 325 mg by mouth Daily.     • furosemide (LASIX) 40 MG tablet Take 0.5 tablets by mouth Daily. 15 tablet 3   • metoprolol tartrate 75 MG tablet Take 75 mg by mouth Every 12 (Twelve) Hours. 60 tablet 3   • potassium chloride (K-DUR,KLOR-CON) 20 MEQ CR tablet Take 0.5 tablets by mouth Daily. 15 tablet 3   • raNITIdine (ZANTAC) 300 MG tablet Take 300 mg by mouth 2 (Two) Times a Day.     • warfarin (COUMADIN) 1 MG tablet Take 2 mg by mouth Daily. Take 1 mg x 2 with warfarin 5 mg to equal warfarin 7 mg daily     • warfarin (COUMADIN) 5 MG tablet Take 5 mg by mouth Daily. Take with 1 mg x 2 to  "equal warfarin 7 mg daily       No current facility-administered medications for this visit.      Past Medical History:   Diagnosis Date   • Atrial fibrillation (CMS/HCC)    • Chronic anticoagulation    • CVA (cerebral vascular accident) (CMS/HCC)    • Diastolic CHF, chronic (CMS/HCC)    • Rheumatic valvular disease as reason for MVR and AVR 1/30/2018   • Seizures (CMS/HCC)      Past Surgical History:   Procedure Laterality Date   • AORTIC VALVE REPAIR/REPLACEMENT  11/30/2011   • MITRAL VALVE REPLACEMENT  11/30/2011   • TUBAL ABDOMINAL LIGATION  1989     The following portions of the patient's history were reviewed and updated as appropriate: allergies, current medications, past family history, past medical history, past social history, past surgical history and problem list.    Review of systems:  Unchanged from prior except HPI    Objective   Ht 162.6 cm (64\")   Wt 101 kg (222 lb 6.4 oz)   BMI 38.17 kg/m²     Physical Exam was performed on 5/2/19  General:  This is a WD WN female in no acute distress  Vital signs stable, afebrile  HEENT exam:  WNL. Sclera are anicteric.  EOMI  Neck:  supple, FROM.  No JVD.  Trachea midline.  No palpable thyroid nodules. No supraclavicular adenopathy  Lungs:  Respiratory effort normal. Auscultation: Clear, without wheezes, rhonchi, rales  Heart:  Regular rate and rhythm, without murmur, gallop, rub.  No pedal edema  Breasts: On visual inspection the breasts are symmetrical, large and pendulous.  Examination of the right breast demonstrates no discrete mass, skin change, or axillary adenopathy.  Examination of the left breast demonstrates no discrete mass, skin change, or axillary adenopathy.  Abdomen: Nontender, without hepatosplenomegaly  Musculoskeletal:  muscle strength/tone is normal.    Psyc:  alert, oriented x 3.  Mood and affect are appropriate  skin:  Warm with good turgor.  Without rash or lesion  extremities:  Examination of the extremities revealed no cyanosis, " clubbing or edema  Results/Data      Procedures     Assessment/Plan   Abnormal mammogram and ultrasound of the right breast with benign findings on core biopsy.  I did discuss this case with breast radiologist Dr. Aviles.  I did feel confident that my needle was in the ultrasound abnormality.  As the patient does have 2 mechanical valves and surgical biopsy would need to stop Coumadin with Lovenox bridge I asked Dr. Aviles how suspicious she was of the abnormality.  She reported that the ultrasound findings were fairly subtle but the mammogram findings on the tomosynthesis images were much more concerning and she did feel that a surgical biopsy should be performed.    Proceed with needle localization bx         Discussion/Summary: anticoagulation plan was written out for patient.   willing to give lovenox shots    Patient's Body mass index is 38.17 kg/m². BMI is above normal and info given.       Errors in dictation may reflect use of voice recognition software and not all errors in transcription may have been detected prior to signing.    Future Appointments   Date Time Provider Department Center   5/2/2019 11:10 AM Natalya Salcedo MD [facility] None   5/2/2019 12:15 PM PAT 3 [facility]     This document has been electronically signed by Natalya SALCEDO MD on May 4, 2019 12:33 PM

## 2019-05-07 ENCOUNTER — ANESTHESIA (OUTPATIENT)
Dept: PERIOP | Facility: HOSPITAL | Age: 54
End: 2019-05-07

## 2019-05-07 ENCOUNTER — ANESTHESIA EVENT (OUTPATIENT)
Dept: PERIOP | Facility: HOSPITAL | Age: 54
End: 2019-05-07

## 2019-05-07 ENCOUNTER — HOSPITAL ENCOUNTER (OUTPATIENT)
Facility: HOSPITAL | Age: 54
Setting detail: HOSPITAL OUTPATIENT SURGERY
Discharge: HOME | End: 2019-05-07
Attending: SURGERY | Admitting: SURGERY

## 2019-05-07 ENCOUNTER — HOSPITAL ENCOUNTER (OUTPATIENT)
Dept: MAMMOGRAPHY | Facility: HOSPITAL | Age: 54
Discharge: HOME | End: 2019-05-07

## 2019-05-07 VITALS
DIASTOLIC BLOOD PRESSURE: 82 MMHG | WEIGHT: 220 LBS | BODY MASS INDEX: 37.56 KG/M2 | HEIGHT: 64 IN | OXYGEN SATURATION: 99 % | TEMPERATURE: 98 F | HEART RATE: 86 BPM | RESPIRATION RATE: 15 BRPM | SYSTOLIC BLOOD PRESSURE: 114 MMHG

## 2019-05-07 DIAGNOSIS — R92.8 ABNORMAL MAMMOGRAM: ICD-10-CM

## 2019-05-07 DIAGNOSIS — R92.8 ABNORMAL MAMMOGRAM OF RIGHT BREAST: ICD-10-CM

## 2019-05-07 LAB
B-HCG UR QL: NEGATIVE
INTERNAL NEGATIVE CONTROL: NEGATIVE
INTERNAL POSITIVE CONTROL: POSITIVE
Lab: NORMAL

## 2019-05-07 PROCEDURE — 19281 PERQ DEVICE BREAST 1ST IMAG: CPT | Performed by: RADIOLOGY

## 2019-05-07 PROCEDURE — 25010000002 FENTANYL CITRATE (PF) 100 MCG/2ML SOLUTION: Performed by: NURSE ANESTHETIST, CERTIFIED REGISTERED

## 2019-05-07 PROCEDURE — 19125 EXCISION BREAST LESION: CPT | Performed by: SURGERY

## 2019-05-07 PROCEDURE — 81025 URINE PREGNANCY TEST: CPT | Performed by: ANESTHESIOLOGY

## 2019-05-07 PROCEDURE — 25010000002 PROPOFOL 10 MG/ML EMULSION: Performed by: NURSE ANESTHETIST, CERTIFIED REGISTERED

## 2019-05-07 PROCEDURE — 25010000003 CEFAZOLIN SODIUM-DEXTROSE 2-3 GM-%(50ML) RECONSTITUTED SOLUTION: Performed by: SURGERY

## 2019-05-07 PROCEDURE — 88341 IMHCHEM/IMCYTCHM EA ADD ANTB: CPT | Performed by: SURGERY

## 2019-05-07 PROCEDURE — 88307 TISSUE EXAM BY PATHOLOGIST: CPT | Performed by: SURGERY

## 2019-05-07 PROCEDURE — 25010000002 KETOROLAC TROMETHAMINE PER 15 MG: Performed by: NURSE ANESTHETIST, CERTIFIED REGISTERED

## 2019-05-07 PROCEDURE — 25010000002 ONDANSETRON PER 1 MG: Performed by: NURSE ANESTHETIST, CERTIFIED REGISTERED

## 2019-05-07 PROCEDURE — 88305 TISSUE EXAM BY PATHOLOGIST: CPT | Performed by: SURGERY

## 2019-05-07 PROCEDURE — 76098 X-RAY EXAM SURGICAL SPECIMEN: CPT | Performed by: SURGERY

## 2019-05-07 PROCEDURE — 25010000002 MIDAZOLAM PER 1 MG: Performed by: NURSE ANESTHETIST, CERTIFIED REGISTERED

## 2019-05-07 PROCEDURE — 88342 IMHCHEM/IMCYTCHM 1ST ANTB: CPT | Performed by: SURGERY

## 2019-05-07 RX ORDER — ONDANSETRON 2 MG/ML
INJECTION INTRAMUSCULAR; INTRAVENOUS AS NEEDED
Status: DISCONTINUED | OUTPATIENT
Start: 2019-05-07 | End: 2019-05-07 | Stop reason: SURG

## 2019-05-07 RX ORDER — PROPOFOL 10 MG/ML
VIAL (ML) INTRAVENOUS AS NEEDED
Status: DISCONTINUED | OUTPATIENT
Start: 2019-05-07 | End: 2019-05-07 | Stop reason: SURG

## 2019-05-07 RX ORDER — ONDANSETRON 2 MG/ML
4 INJECTION INTRAMUSCULAR; INTRAVENOUS ONCE AS NEEDED
Status: DISCONTINUED | OUTPATIENT
Start: 2019-05-07 | End: 2019-05-07 | Stop reason: HOSPADM

## 2019-05-07 RX ORDER — CEFAZOLIN SODIUM 2 G/50ML
2 SOLUTION INTRAVENOUS ONCE
Status: COMPLETED | OUTPATIENT
Start: 2019-05-07 | End: 2019-05-07

## 2019-05-07 RX ORDER — MIDAZOLAM HYDROCHLORIDE 1 MG/ML
INJECTION INTRAMUSCULAR; INTRAVENOUS AS NEEDED
Status: DISCONTINUED | OUTPATIENT
Start: 2019-05-07 | End: 2019-05-07 | Stop reason: SURG

## 2019-05-07 RX ORDER — IPRATROPIUM BROMIDE AND ALBUTEROL SULFATE 2.5; .5 MG/3ML; MG/3ML
3 SOLUTION RESPIRATORY (INHALATION) ONCE AS NEEDED
Status: DISCONTINUED | OUTPATIENT
Start: 2019-05-07 | End: 2019-05-07 | Stop reason: HOSPADM

## 2019-05-07 RX ORDER — FENTANYL CITRATE 50 UG/ML
50 INJECTION, SOLUTION INTRAMUSCULAR; INTRAVENOUS
Status: DISCONTINUED | OUTPATIENT
Start: 2019-05-07 | End: 2019-05-07 | Stop reason: HOSPADM

## 2019-05-07 RX ORDER — MAGNESIUM HYDROXIDE 1200 MG/15ML
LIQUID ORAL AS NEEDED
Status: DISCONTINUED | OUTPATIENT
Start: 2019-05-07 | End: 2019-05-07 | Stop reason: HOSPADM

## 2019-05-07 RX ORDER — FENTANYL CITRATE 50 UG/ML
INJECTION, SOLUTION INTRAMUSCULAR; INTRAVENOUS AS NEEDED
Status: DISCONTINUED | OUTPATIENT
Start: 2019-05-07 | End: 2019-05-07 | Stop reason: SURG

## 2019-05-07 RX ORDER — BUPIVACAINE HYDROCHLORIDE AND EPINEPHRINE 5; 5 MG/ML; UG/ML
INJECTION, SOLUTION EPIDURAL; INTRACAUDAL; PERINEURAL AS NEEDED
Status: DISCONTINUED | OUTPATIENT
Start: 2019-05-07 | End: 2019-05-07 | Stop reason: HOSPADM

## 2019-05-07 RX ORDER — OXYCODONE HYDROCHLORIDE AND ACETAMINOPHEN 5; 325 MG/1; MG/1
1 TABLET ORAL ONCE AS NEEDED
Status: DISCONTINUED | OUTPATIENT
Start: 2019-05-07 | End: 2019-05-07 | Stop reason: HOSPADM

## 2019-05-07 RX ORDER — SODIUM CHLORIDE 0.9 % (FLUSH) 0.9 %
3 SYRINGE (ML) INJECTION EVERY 12 HOURS SCHEDULED
Status: DISCONTINUED | OUTPATIENT
Start: 2019-05-07 | End: 2019-05-07 | Stop reason: HOSPADM

## 2019-05-07 RX ORDER — SODIUM CHLORIDE, SODIUM LACTATE, POTASSIUM CHLORIDE, CALCIUM CHLORIDE 600; 310; 30; 20 MG/100ML; MG/100ML; MG/100ML; MG/100ML
125 INJECTION, SOLUTION INTRAVENOUS CONTINUOUS
Status: DISCONTINUED | OUTPATIENT
Start: 2019-05-07 | End: 2019-05-07 | Stop reason: HOSPADM

## 2019-05-07 RX ORDER — HYDROCODONE BITARTRATE AND ACETAMINOPHEN 7.5; 325 MG/1; MG/1
1 TABLET ORAL 4 TIMES DAILY PRN
Qty: 12 TABLET | Refills: 0 | Status: SHIPPED | OUTPATIENT
Start: 2019-05-07

## 2019-05-07 RX ORDER — FAMOTIDINE 10 MG/ML
INJECTION, SOLUTION INTRAVENOUS AS NEEDED
Status: DISCONTINUED | OUTPATIENT
Start: 2019-05-07 | End: 2019-05-07 | Stop reason: SURG

## 2019-05-07 RX ORDER — LIDOCAINE HYDROCHLORIDE 20 MG/ML
INJECTION, SOLUTION INFILTRATION; PERINEURAL AS NEEDED
Status: DISCONTINUED | OUTPATIENT
Start: 2019-05-07 | End: 2019-05-07 | Stop reason: SURG

## 2019-05-07 RX ORDER — MEPERIDINE HYDROCHLORIDE 25 MG/ML
12.5 INJECTION INTRAMUSCULAR; INTRAVENOUS; SUBCUTANEOUS
Status: DISCONTINUED | OUTPATIENT
Start: 2019-05-07 | End: 2019-05-07 | Stop reason: HOSPADM

## 2019-05-07 RX ORDER — KETOROLAC TROMETHAMINE 30 MG/ML
INJECTION, SOLUTION INTRAMUSCULAR; INTRAVENOUS AS NEEDED
Status: DISCONTINUED | OUTPATIENT
Start: 2019-05-07 | End: 2019-05-07 | Stop reason: SURG

## 2019-05-07 RX ORDER — SODIUM CHLORIDE 0.9 % (FLUSH) 0.9 %
3-10 SYRINGE (ML) INJECTION AS NEEDED
Status: DISCONTINUED | OUTPATIENT
Start: 2019-05-07 | End: 2019-05-07 | Stop reason: HOSPADM

## 2019-05-07 RX ADMIN — FENTANYL CITRATE 75 MCG: 50 INJECTION INTRAMUSCULAR; INTRAVENOUS at 12:05

## 2019-05-07 RX ADMIN — FAMOTIDINE 20 MG: 10 INJECTION, SOLUTION INTRAVENOUS at 11:06

## 2019-05-07 RX ADMIN — CEFAZOLIN SODIUM 2 G: 2 SOLUTION INTRAVENOUS at 11:13

## 2019-05-07 RX ADMIN — LIDOCAINE HYDROCHLORIDE 40 MG: 20 INJECTION, SOLUTION INFILTRATION; PERINEURAL at 11:06

## 2019-05-07 RX ADMIN — SODIUM CHLORIDE, POTASSIUM CHLORIDE, SODIUM LACTATE AND CALCIUM CHLORIDE 125 ML/HR: 600; 310; 30; 20 INJECTION, SOLUTION INTRAVENOUS at 09:45

## 2019-05-07 RX ADMIN — EPHEDRINE SULFATE 5 MG: 50 INJECTION INTRAMUSCULAR; INTRAVENOUS; SUBCUTANEOUS at 11:35

## 2019-05-07 RX ADMIN — FENTANYL CITRATE 25 MCG: 50 INJECTION INTRAMUSCULAR; INTRAVENOUS at 11:26

## 2019-05-07 RX ADMIN — PROPOFOL 100 MG: 10 INJECTION, EMULSION INTRAVENOUS at 11:11

## 2019-05-07 RX ADMIN — EPHEDRINE SULFATE 5 MG: 50 INJECTION INTRAMUSCULAR; INTRAVENOUS; SUBCUTANEOUS at 11:50

## 2019-05-07 RX ADMIN — KETOROLAC TROMETHAMINE 30 MG: 30 INJECTION, SOLUTION INTRAMUSCULAR; INTRAVENOUS at 12:08

## 2019-05-07 RX ADMIN — ONDANSETRON 4 MG: 2 INJECTION, SOLUTION INTRAMUSCULAR; INTRAVENOUS at 11:06

## 2019-05-07 RX ADMIN — EPHEDRINE SULFATE 5 MG: 50 INJECTION INTRAMUSCULAR; INTRAVENOUS; SUBCUTANEOUS at 11:53

## 2019-05-07 RX ADMIN — MIDAZOLAM HYDROCHLORIDE 2 MG: 1 INJECTION, SOLUTION INTRAMUSCULAR; INTRAVENOUS at 11:06

## 2019-05-07 NOTE — ANESTHESIA PREPROCEDURE EVALUATION
Anesthesia Evaluation     Patient summary reviewed and Nursing notes reviewed   no history of anesthetic complications:  NPO Solid Status: > 8 hours  NPO Liquid Status: > 8 hours           Airway   Mallampati: II  TM distance: >3 FB  Neck ROM: full  no difficulty expected  Dental - normal exam   (+) edentulous    Pulmonary - normal exam   (+) a smoker Former,   (-) asthma  Cardiovascular - normal exam  Exercise tolerance: good (4-7 METS)    NYHA Classification: II  PT is on anticoagulation therapy  Patient on routine beta blocker and Beta blocker given within 24 hours of surgery    (+) valvular problems/murmurs, dysrhythmias Atrial Fib, CHF, hyperlipidemia,   (-) past MI, angina      Neuro/Psych  (+) seizures (possible h/o eclampsia with pregnancy. None since then), neuromuscular disease, CVA residual symptoms,     GI/Hepatic/Renal/Endo    (+) morbid obesity, GERD,    (-) liver disease, no renal disease, diabetes, hypothyroidism    Musculoskeletal (-) negative ROS    Abdominal  - normal exam    Bowel sounds: normal.   Substance History - negative use     OB/GYN negative ob/gyn ROS         Other        (-) history of cancer                  Anesthesia Plan    ASA 3     general     intravenous induction   Anesthetic plan, all risks, benefits, and alternatives have been provided, discussed and informed consent has been obtained with: patient.  Use of blood products discussed with patient  Consented to blood products.

## 2019-05-07 NOTE — ANESTHESIA PROCEDURE NOTES
Airway  Urgency: elective    Airway not difficult    General Information and Staff    Patient location during procedure: OR  CRNA: Crystal Mcclure CRNA    Indications and Patient Condition  Indications for airway management: airway protection    Preoxygenated: yes  MILS maintained throughout  Mask difficulty assessment: 0 - not attempted    Final Airway Details  Final airway type: supraglottic airway      Successful airway: unique  Size 4    Number of attempts at approach: 1               Pts nurse called sw, pt does not have cash or credit card to pay $9 copay for meds. SW faxed cost transfer form to pharmacy for $9.00.    No additional sw needs at this time.    Lynette Davila, AURAW [phone]

## 2019-05-07 NOTE — ANESTHESIA POSTPROCEDURE EVALUATION
Patient: Lavern Youngblood    Procedure Summary     Date:  05/07/19 Room / Location:  [facility] OR 02 /  [facility] OR    Anesthesia Start:  1106 Anesthesia Stop:  1213    Procedure:  BREAST BIOPSY WITH NEEDLE LOCALIZATION (Right Breast) Diagnosis:       Abnormal mammogram      (Abnormal mammogram [R92.8])    Surgeon:  Natalya Yeh MD Provider:  Kermit Garcia MD    Anesthesia Type:  general ASA Status:  3          Anesthesia Type: general  Last vitals  BP   114/82 (05/07/19 1308)   Temp   98 °F (36.7 °C) (05/07/19 1252)   Pulse   86 (05/07/19 1308)   Resp   15 (05/07/19 1308)     SpO2   99 % (05/07/19 1308)     Post Anesthesia Care and Evaluation    Patient location during evaluation: PHASE II  Patient participation: complete - patient participated  Level of consciousness: awake and alert  Pain score: 1  Pain management: adequate  Airway patency: patent  Anesthetic complications: No anesthetic complications  PONV Status: controlled  Cardiovascular status: acceptable and stable  Respiratory status: acceptable  Hydration status: stable  No anesthesia care post op

## 2019-05-07 NOTE — OP NOTE
Right breast needle localization biopsy with specimen x-ray    Surgeon:  Natalya Yeh M.D., ANDRE    Assistant; Elie Gillis    Pre-op: Abnormal mammogram right breast with discordant findings on core biopsy    Post-op:  Same    Anesthesia:  general    Indications: Abnormal mammogram right breast with discordant findings on core biopsy       Procedure Details   After obtaining informed consent and with venous compression boots in place and receiving preoperative antibiotics the patient was taken to the operating room and placed under anesthesia.  Preoperative imaging was reviewed prior to the procedure.  The right breast was prepped and draped in a sterile fashion.  In addition to needle localization the area was visible on ultrasound and ultrasound was utilized to identify the area as well.  An incision was made high in the 11 to 12 o'clock position and carried down into the breast tissue.  The area of concern was identified and was excised.  Clinically the area was firm and was suspicious for neoplasm.  After removal of the main specimen and confirmation that the area of concern had been removed additional tissue was removed inferiorly, medially, posteriorly, inferior laterally where they did grossly appear to be possible small areas of tumor..    The wound was irrigated and hemostasis was obtained.  The incision was closed in a several layer closure of deep 0 Vicryl sutures, followed by 3-0 Vicryl subdermal sutures and 4-0 Vicryl subcuticular suture.    Findings:    Estimated Blood Loss:  Minimal    Blood administered:            Drains: none           Specimens:   ID Type Source Tests Collected by Time   A : biopsy with needle localization Tissue Breast, Right TISSUE PATHOLOGY EXAM Natalya Yeh MD 5/7/2019 1139   B : additional 3 o'clock margin ink @ true margin Tissue Breast, Right TISSUE PATHOLOGY EXAM Natalya Yeh MD 5/7/2019 1155   C : additional posterior margin ink @ true posterior margin  Tissue Breast, Right TISSUE PATHOLOGY EXAM Natalya Yeh MD 5/7/2019 1155   D : anterior 5 o'clock margin ink @ true margin Tissue Breast, Right TISSUE PATHOLOGY EXAM Natalya Yeh MD 5/7/2019 1155   E : anterior 7 o'clock margin ink @ true margin Tissue Breast, Right TISSUE PATHOLOGY EXAM Natalya Yeh MD 5/7/2019 1155   F : additional inferior margin ink @ true margin Tissue Breast, Right TISSUE PATHOLOGY EXAM Natalya Yeh MD 5/7/2019 1155        Grafts and Implants: None        Complications:  none           Disposition: PACU - hemodynamically stable.           Condition: stable

## 2019-05-10 LAB
LAB AP CASE REPORT: NORMAL
LAB AP CLINICAL INFORMATION: NORMAL
PATH REPORT.FINAL DX SPEC: NORMAL

## 2019-05-16 ENCOUNTER — OFFICE VISIT (OUTPATIENT)
Dept: SURGERY | Facility: CLINIC | Age: 54
End: 2019-05-16

## 2019-05-16 VITALS
WEIGHT: 227.6 LBS | SYSTOLIC BLOOD PRESSURE: 161 MMHG | BODY MASS INDEX: 38.86 KG/M2 | HEIGHT: 64 IN | HEART RATE: 63 BPM | DIASTOLIC BLOOD PRESSURE: 96 MMHG

## 2019-05-16 DIAGNOSIS — R92.8 ABNORMAL MAMMOGRAM: ICD-10-CM

## 2019-05-16 DIAGNOSIS — R92.8 ABNORMAL ULTRASOUND OF BREAST: Primary | ICD-10-CM

## 2019-05-16 DIAGNOSIS — Z09 POSTOP CHECK: ICD-10-CM

## 2019-05-16 PROCEDURE — 99024 POSTOP FOLLOW-UP VISIT: CPT | Performed by: SURGERY

## 2019-05-16 NOTE — PROGRESS NOTES
Subjective   Lavern Youngblood is a 54 y.o. female here today for post op and pathology report.    History of Present Illness  Ms. Youngblood was seen in the office today for her first postoperative visit following a right breast excisional biopsy with needle and ultrasound localization.  Final pathology was consistent with a radial scar and fat necrosis.  No malignancy was identified.  The patient restarted her anticoagulation per instructions.  No Known Allergies      Current Outpatient Medications   Medication Sig Dispense Refill   • alendronate (FOSAMAX) 40 MG tablet Take 70 mg by mouth Every 7 (Seven) Days. TAKE ON SUNDAY      • atorvastatin (LIPITOR) 10 MG tablet Take 10 mg by mouth Daily.     • calcium citrate-vitamin d (CITRACAL) 200-250 MG-UNIT tablet tablet Take 1 tablet by mouth 2 (Two) Times a Day.     • enoxaparin (LOVENOX) 100 MG/ML solution syringe Inject 100 mg under the skin into the appropriate area as directed Every 12 (Twelve) Hours. WILL START 5/03/2019   X6 DAYS PER DR SALCEDO     • enoxaparin (LOVENOX) 100 MG/ML solution syringe Inject 1 mL under the skin into the appropriate area as directed 2 (Two) Times a Day for 6 days. 12 mL 0   • ferrous sulfate 325 (65 FE) MG tablet Take 325 mg by mouth Daily.     • furosemide (LASIX) 40 MG tablet Take 0.5 tablets by mouth Daily. 15 tablet 3   • HYDROcodone-acetaminophen (NORCO) 7.5-325 MG per tablet Take 1 tablet by mouth 4 (Four) Times a Day As Needed for Moderate Pain . 12 tablet 0   • metoprolol tartrate 75 MG tablet Take 75 mg by mouth Every 12 (Twelve) Hours. (Patient taking differently: Take 100 mg by mouth Every 12 (Twelve) Hours.) 60 tablet 3   • potassium chloride (K-DUR,KLOR-CON) 20 MEQ CR tablet Take 0.5 tablets by mouth Daily. 15 tablet 3   • warfarin (COUMADIN) 1 MG tablet Take 2 mg by mouth Daily. Take 1 mg x 2 with warfarin 5 mg to equal warfarin 7 mg daily     • warfarin (COUMADIN) 5 MG tablet Take 5 mg by mouth Daily. Take with 1 mg x 2 to  "equal warfarin 7 mg daily       No current facility-administered medications for this visit.              Objective   Ht 162.6 cm (64\")   Wt 103 kg (227 lb 9.6 oz)   BMI 39.07 kg/m²    Physical Exam  On examination this is a well-developed well-nourished female in no acute distress  HEENT examination: Within normal limits.  Conjunctiva pink.  Nose and ears appear normal.  Neck: Supple, full range of motion.  No JVD.  Musculoskeletal: Full range of motion all extremities without focal weakness. Normal gait. No digital cyanosis.  Psych: Patient is alert, oriented x3. Mood and affect are appropriate.  Right breast: The breast is large and pendulous.  Surgical scar in the 11:30 position.  No significant hematoma or seroma identified.  Results/Data  Pathology report was reviewed with the patient    Procedures     Assessment/Plan   Stable course status post right breast excisional biopsy for radial scar    Follow-up with me as needed  Follow-up with radiology as indicated       Discussion/Summary    Errors in dictation may reflect use of voice recognition software and not all errors in transcription may have been detected prior to signing.    No future appointments.    "

## 2020-05-20 ENCOUNTER — APPOINTMENT (OUTPATIENT)
Dept: MAMMOGRAPHY | Facility: HOSPITAL | Age: 55
End: 2020-05-20

## 2020-06-18 ENCOUNTER — HOSPITAL ENCOUNTER (OUTPATIENT)
Dept: MAMMOGRAPHY | Facility: HOSPITAL | Age: 55
Discharge: HOME | End: 2020-06-18
Admitting: FAMILY MEDICINE

## 2020-06-18 DIAGNOSIS — Z12.31 VISIT FOR SCREENING MAMMOGRAM: ICD-10-CM

## 2020-06-18 PROCEDURE — 77063 BREAST TOMOSYNTHESIS BI: CPT | Performed by: RADIOLOGY

## 2020-06-18 PROCEDURE — 77067 SCR MAMMO BI INCL CAD: CPT

## 2020-06-18 PROCEDURE — 77067 SCR MAMMO BI INCL CAD: CPT | Performed by: RADIOLOGY

## 2020-06-18 PROCEDURE — 77063 BREAST TOMOSYNTHESIS BI: CPT

## 2021-06-21 ENCOUNTER — HOSPITAL ENCOUNTER (OUTPATIENT)
Dept: MAMMOGRAPHY | Facility: HOSPITAL | Age: 56
Discharge: HOME | End: 2021-06-21

## 2021-06-21 ENCOUNTER — HOSPITAL ENCOUNTER (OUTPATIENT)
Dept: BONE DENSITY | Facility: HOSPITAL | Age: 56
Discharge: HOME | End: 2021-06-21

## 2021-06-21 DIAGNOSIS — N95.9 MENOPAUSAL PROBLEM: ICD-10-CM

## 2021-06-21 DIAGNOSIS — Z12.31 VISIT FOR SCREENING MAMMOGRAM: ICD-10-CM

## 2021-06-21 PROCEDURE — 77080 DXA BONE DENSITY AXIAL: CPT | Performed by: RADIOLOGY

## 2021-06-21 PROCEDURE — 77067 SCR MAMMO BI INCL CAD: CPT | Performed by: RADIOLOGY

## 2021-06-21 PROCEDURE — 77063 BREAST TOMOSYNTHESIS BI: CPT | Performed by: RADIOLOGY

## 2021-06-21 PROCEDURE — 77067 SCR MAMMO BI INCL CAD: CPT

## 2021-06-21 PROCEDURE — 77080 DXA BONE DENSITY AXIAL: CPT

## 2021-06-21 PROCEDURE — 77063 BREAST TOMOSYNTHESIS BI: CPT

## 2023-05-01 ENCOUNTER — HOSPITAL ENCOUNTER (OUTPATIENT)
Dept: MAMMOGRAPHY | Facility: HOSPITAL | Age: 58
Discharge: HOME | End: 2023-05-01
Payer: MEDICARE

## 2023-05-01 ENCOUNTER — HOSPITAL ENCOUNTER (OUTPATIENT)
Dept: BONE DENSITY | Facility: HOSPITAL | Age: 58
Discharge: HOME | End: 2023-05-01
Payer: MEDICARE

## 2023-05-01 DIAGNOSIS — Z12.31 VISIT FOR SCREENING MAMMOGRAM: ICD-10-CM

## 2023-05-01 DIAGNOSIS — Z78.0 POSTMENOPAUSAL: ICD-10-CM

## 2023-05-01 PROCEDURE — 77063 BREAST TOMOSYNTHESIS BI: CPT

## 2023-05-01 PROCEDURE — 77063 BREAST TOMOSYNTHESIS BI: CPT | Performed by: RADIOLOGY

## 2023-05-01 PROCEDURE — 77067 SCR MAMMO BI INCL CAD: CPT

## 2023-05-01 PROCEDURE — 77067 SCR MAMMO BI INCL CAD: CPT | Performed by: RADIOLOGY

## 2024-07-11 ENCOUNTER — TRANSCRIBE ORDERS (OUTPATIENT)
Dept: ADMINISTRATIVE | Facility: HOSPITAL | Age: 59
End: 2024-07-11
Payer: MEDICARE

## 2024-07-11 DIAGNOSIS — Z12.31 VISIT FOR SCREENING MAMMOGRAM: Primary | ICD-10-CM

## 2024-07-11 DIAGNOSIS — Z78.0 ASYMPTOMATIC MENOPAUSAL STATE: ICD-10-CM

## 2025-03-28 ENCOUNTER — HOSPITAL ENCOUNTER (OUTPATIENT)
Dept: MAMMOGRAPHY | Facility: HOSPITAL | Age: 60
Discharge: HOME | End: 2025-03-28
Payer: MEDICARE

## 2025-03-28 ENCOUNTER — HOSPITAL ENCOUNTER (OUTPATIENT)
Dept: BONE DENSITY | Facility: HOSPITAL | Age: 60
Discharge: HOME | End: 2025-03-28
Payer: MEDICARE

## 2025-03-28 DIAGNOSIS — Z78.0 ASYMPTOMATIC MENOPAUSAL STATE: ICD-10-CM

## 2025-03-28 DIAGNOSIS — Z12.31 VISIT FOR SCREENING MAMMOGRAM: ICD-10-CM

## 2025-03-28 PROCEDURE — 77067 SCR MAMMO BI INCL CAD: CPT

## 2025-03-28 PROCEDURE — 77080 DXA BONE DENSITY AXIAL: CPT

## 2025-03-28 PROCEDURE — 77063 BREAST TOMOSYNTHESIS BI: CPT

## (undated) DEVICE — AMD ANTIMICROBIAL NON-ADHERENT ISLAND DRESSING,0.2% POLYHEXAMETHYLENE BIGUANIDE HCI (PHMB): Brand: TELFA

## (undated) DEVICE — PK BASIC 70

## (undated) DEVICE — KT INK TISS MARGINMARKER STD 6COLOR

## (undated) DEVICE — SUT VIC 3/0 SH 27IN J416H

## (undated) DEVICE — ENCORE® LATEX MICRO SIZE 7, STERILE LATEX POWDER-FREE SURGICAL GLOVE: Brand: ENCORE

## (undated) DEVICE — WRP SURG BRA VELCRO/FRNT 44/46IN 3XL LF

## (undated) DEVICE — DRAPE,T,LAPARO,TRANS,STERILE: Brand: MEDLINE

## (undated) DEVICE — SUT SILK 2/0 SH 30IN K833H

## (undated) DEVICE — DEV TRNSPEC

## (undated) DEVICE — SPNG LAP PREWSH SFTPK 18X18IN STRL PK/5

## (undated) DEVICE — UNDYED BRAIDED (POLYGLACTIN 910), SYNTHETIC ABSORBABLE SUTURE: Brand: COATED VICRYL

## (undated) DEVICE — APPL CHLORAPREP W/TINT 26ML ORNG

## (undated) DEVICE — VIOLET BRAIDED (POLYGLACTIN 910), SYNTHETIC ABSORBABLE SUTURE: Brand: COATED VICRYL

## (undated) DEVICE — SHEET,DRAPE,53X77,STERILE: Brand: MEDLINE

## (undated) DEVICE — HOLDER: Brand: DEROYAL

## (undated) DEVICE — PAD GRND REM POLYHESIVE A/ DISP